# Patient Record
Sex: FEMALE | Race: WHITE | Employment: FULL TIME | URBAN - METROPOLITAN AREA
[De-identification: names, ages, dates, MRNs, and addresses within clinical notes are randomized per-mention and may not be internally consistent; named-entity substitution may affect disease eponyms.]

---

## 2018-01-13 ENCOUNTER — APPOINTMENT (OUTPATIENT)
Dept: GENERAL RADIOLOGY | Facility: CLINIC | Age: 48
End: 2018-01-13
Attending: EMERGENCY MEDICINE
Payer: COMMERCIAL

## 2018-01-13 ENCOUNTER — APPOINTMENT (OUTPATIENT)
Dept: CT IMAGING | Facility: CLINIC | Age: 48
End: 2018-01-13
Attending: EMERGENCY MEDICINE
Payer: COMMERCIAL

## 2018-01-13 ENCOUNTER — HOSPITAL ENCOUNTER (EMERGENCY)
Facility: CLINIC | Age: 48
Discharge: HOME | End: 2018-01-13
Attending: EMERGENCY MEDICINE | Admitting: EMERGENCY MEDICINE
Payer: COMMERCIAL

## 2018-01-13 VITALS
DIASTOLIC BLOOD PRESSURE: 88 MMHG | RESPIRATION RATE: 16 BRPM | TEMPERATURE: 97.9 F | WEIGHT: 158 LBS | BODY MASS INDEX: 26.29 KG/M2 | OXYGEN SATURATION: 95 % | SYSTOLIC BLOOD PRESSURE: 141 MMHG | HEART RATE: 111 BPM

## 2018-01-13 DIAGNOSIS — S10.93XA CONTUSION OF FACE, SCALP AND NECK, INITIAL ENCOUNTER: ICD-10-CM

## 2018-01-13 DIAGNOSIS — S00.03XA CONTUSION OF FACE, SCALP AND NECK, INITIAL ENCOUNTER: ICD-10-CM

## 2018-01-13 DIAGNOSIS — S00.83XA CONTUSION OF FACE, SCALP AND NECK, INITIAL ENCOUNTER: ICD-10-CM

## 2018-01-13 DIAGNOSIS — S09.90XA CLOSED HEAD INJURY, INITIAL ENCOUNTER: ICD-10-CM

## 2018-01-13 PROCEDURE — 72125 CT NECK SPINE W/O DYE: CPT

## 2018-01-13 PROCEDURE — 72072 X-RAY EXAM THORAC SPINE 3VWS: CPT

## 2018-01-13 PROCEDURE — 25000132 ZZH RX MED GY IP 250 OP 250 PS 637: Performed by: EMERGENCY MEDICINE

## 2018-01-13 PROCEDURE — 25000128 H RX IP 250 OP 636: Performed by: EMERGENCY MEDICINE

## 2018-01-13 PROCEDURE — 96374 THER/PROPH/DIAG INJ IV PUSH: CPT

## 2018-01-13 PROCEDURE — 70450 CT HEAD/BRAIN W/O DYE: CPT

## 2018-01-13 PROCEDURE — 99285 EMERGENCY DEPT VISIT HI MDM: CPT | Mod: 25

## 2018-01-13 PROCEDURE — 70480 CT ORBIT/EAR/FOSSA W/O DYE: CPT

## 2018-01-13 PROCEDURE — 73110 X-RAY EXAM OF WRIST: CPT | Mod: LT

## 2018-01-13 RX ORDER — HYDROMORPHONE HYDROCHLORIDE 1 MG/ML
0.5 INJECTION, SOLUTION INTRAMUSCULAR; INTRAVENOUS; SUBCUTANEOUS
Status: DISCONTINUED | OUTPATIENT
Start: 2018-01-13 | End: 2018-01-13 | Stop reason: HOSPADM

## 2018-01-13 RX ORDER — LIDOCAINE HYDROCHLORIDE AND EPINEPHRINE 10; 10 MG/ML; UG/ML
INJECTION, SOLUTION INFILTRATION; PERINEURAL
Status: DISCONTINUED
Start: 2018-01-13 | End: 2018-01-13 | Stop reason: HOSPADM

## 2018-01-13 RX ORDER — OXYCODONE AND ACETAMINOPHEN 5; 325 MG/1; MG/1
TABLET ORAL EVERY 4 HOURS PRN
COMMUNITY
End: 2018-01-13

## 2018-01-13 RX ORDER — OXYCODONE HYDROCHLORIDE 5 MG/1
5 TABLET ORAL ONCE
Status: COMPLETED | OUTPATIENT
Start: 2018-01-13 | End: 2018-01-13

## 2018-01-13 RX ORDER — OXYCODONE AND ACETAMINOPHEN 5; 325 MG/1; MG/1
1-2 TABLET ORAL EVERY 6 HOURS PRN
Qty: 10 TABLET | Refills: 0 | Status: SHIPPED | OUTPATIENT
Start: 2018-01-13

## 2018-01-13 RX ORDER — OXYCODONE HYDROCHLORIDE 5 MG/1
5 TABLET ORAL ONCE
Status: DISCONTINUED | OUTPATIENT
Start: 2018-01-13 | End: 2018-01-13

## 2018-01-13 RX ADMIN — OXYCODONE HYDROCHLORIDE 5 MG: 5 TABLET ORAL at 12:53

## 2018-01-13 RX ADMIN — Medication 0.5 MG: at 14:06

## 2018-01-13 ASSESSMENT — ENCOUNTER SYMPTOMS
FACIAL SWELLING: 1
NECK PAIN: 0
ABDOMINAL PAIN: 0
ABDOMINAL DISTENTION: 1
ARTHRALGIAS: 1
BACK PAIN: 1
HEADACHES: 1

## 2018-01-13 NOTE — ED AVS SNAPSHOT
[facility] Emergency Department    [address] [place] [address]    Phone:  [phone]    Fax:  [phone]                                       Ella Malave   MRN: [id number]    Department:  [facility] Emergency Department   Date of Visit:  1/13/2018           Patient Information     Date Of Birth          1970        Your diagnoses for this visit were:     Closed head injury, initial encounter     Contusion of face, scalp and neck, initial encounter        You were seen by Radha Minaya MD.      Follow-up Information     Follow up with Clinic, [facility] In 2 days.    Contact information:    [address] [facility] [address] [phone]          Discharge Instructions       Discharge Instructions  Trauma    You were seen today for an injury due to some kind of trauma (crash, fall, etc.).  Some injuries may not show up until after you leave the Emergency Department.  It is important that you pay attention to these instructions and follow-up with your regular doctor as instructed.    Return to the Emergency Department right away if:    You have abdominal pain or bruises, chest pain, pain in a new area, or pain that is getting worse.    You get short of breath.    You develop a fever over 101 degrees.    You have weakness in your arms or legs.    You faint or you are very lightheaded.    You have any new symptoms, you are feeling weak or unusually ill, or something worries you.     Injuries to the brain are possible with any accident.  Return right away if you have confusion, vomiting more than once, difficulty walking or a headache that is getting worse. Bring a child or a person who can t talk back if they seem to be behaving in an abnormal way.      MORE INFORMATION:    General Injuries:    Aches and pains are usually worse the day after your accident, but should not be severe, and should start getting better after  that. Aches and pains are common in the neck and back.    Injuries from your accident may prevent you from working.  Follow-up with your regular doctor to get a work note and to find out how long you will not be able to work.    Pain medications or your injuries may make it unsafe for you to drive or operate machinery.    Use ice to injured areas for the first one or two days. Apply a bag of ice wrapped in a cloth for about 15 minutes at a time. You can do this as often as once an hour. Do not sleep with an ice pack, since it can burn you.     You can use non-prescription pain medicine, like Tylenol  (acetaminophen), Advil  (ibuprofen), Motrin  (ibuprofen), Nuprin  (ibuprofen) if your emergency doctor or your own doctor told you this is okay. Tylenol  (acetaminophen) is in many prescription medicines and non-prescription medicines--check all of your medicines to be sure you aren t taking more than 3000 mg per day.    Limit your activity for at least one or two days. Avoid doing things that hurt.    You need to see your doctor if any injured area is not back to normal in 1 week.    Car Accident:    If you have been on a backboard or had a neck collar on, this may make you stiff and sore.  This should get better in 1-2 days.  Return to the Emergency Department if the pain or discomfort is severe or gets worse.    Be careful of shards of glass on your body or in your belongings.    Fractures, Sprains, and Strains:    Return to the Emergency Department right away if your injured area gets more painful, if the splint or dressing seems to be too tight, if it gets numb or tingly past the injury, or if the area past the injury gets pale, blue, or cold.    Use your crutches if you were given them today. Don t put weight on the injured area until the pain is gone.    Keep the injured area above the level of your heart while laying or sitting down.  This well help lessen the swelling (puffiness) and the pain.    You may use an  elastic bandage (Ace  Wrap) if it makes you more comfortable. Wrap it just tight enough to provide mild compression, and loosen it if you get swelling past the bandage.    Note about X-rays: If you had x-rays done today, they were read by your emergency physician. They will also be read later by a radiologist. We will contact you if the radiologist thinks they show something different than the emergency physician did. Remember that there are some fractures (breaks in the bone) that can t be seen right away. Even if your x-rays today were normal, you must see your doctor in clinic to re-check.     Splints:    A splint put on in the Emergency Department is temporary. Your regular doctor or orthopedic doctor will remove it, and replace it with a cast or boot if needed.    Keep the splint dry. Cover it with a plastic bag when you wash. Even with a plastic bag, you still can t get in water or let water get right on it. If it does get wet, you should come back or see your doctor to have it replaced.    Do not put objects inside the splint to scratch.    If there is an elastic bandage (Ace  Wrap) holding the splint on this may be loosened a little to relieve pressure or pain.  If pain continues return to the Emergency Department right away.    Return if the splint starts cutting into your skin.    Do not remove your splint by yourself unless told to by your doctor. You can t take it off and put it back on again.     Wounds:    Infections can follow many injuries. Watch for fevers, redness spreading from the wound, pus or stitches that open up. Return here or see your doctor if these happen.    There can always be glass, wood, dirt or other things in any wound.  They won t always show up even on x-rays.  If a wound doesn t heal, this may be why, and it is important to follow-up with your regular doctor. Small pieces of glass or other materials may work their way out on their own.    Cuts or scrapes may start to bleed after  leaving the Emergency Department.  If this happens, hold pressure on the bleeding area with a clean cloth or put pressure over the bandage.  If the bleeding doesn t stop after you use constant pressure for   hour, you should return to the Emergency Department for further treatment.    Any bandage or dressing put on here should be removed in 12-24 hours, or as your doctor instructs. Remove the dressing sooner if it seems too tight or painful, or if it is getting numb, tingly, or pale past the dressing.    After you take off the dressing, wash the cut or scrape with soap and water once or twice a day.    Apply ointment like Bacitracin  (polypeptide antibiotic) to scrapes or cuts, and keep them covered with a Band-Aid  or gauze if possible, until they heal up or until your stitches are taken out.    Dermabond  or Steri-Strips  should be left alone and will come off by themselves.  Dissolving stitches should go away or fall out within about a week.    Regular stitches need to be taken out by your doctor in clinic.  Call today and schedule an appointment.  Leave your stitches in for as long as you were told today.    Most injuries are preventable!  As your local emergency physicians, we encourage you to:    Wear your seat belt.    Do not talk on your cell phone while driving.    Do not read or send text messages while driving.    Wear a bike or motorcycle helmet.    Wear a helmet while skiing and snowboarding.    Wear personal flotation devices at all times while on the water.    Always have your child in a car seat.    Do not allow children less than 12 years old to ride in the front seat.    Go to the CDC website to find more information on preventing injures:  http://www.cdc.gov/injury/index.html    If you were given a prescription for medicine here today, be sure to read all of the information (including the package insert) that comes with your prescription.  This will include important information about the  medicine, its side effects, and any warnings that you need to know about.  The pharmacist who fills the prescription can provide more information and answer questions you may have about the medicine.  If you have questions or concerns that the pharmacist cannot address, please call or return to the Emergency Department.     Opioid Medication Information    Pain medications are among the most commonly prescribed medicines, so we are including this information for all our patients. If you did not receive pain medication or get a prescription for pain medicine, you can ignore it.     You may have been given a prescription for an opioid (narcotic) pain medicine and/or have received a pain medicine while here in the Emergency Department. These medicines can make you drowsy or impaired. You must not drive, operate dangerous equipment, or engage in any other dangerous activities while taking these medications. If you drive while taking these medications, you could be arrested for DUI, or driving under the influence. Do not drink any alcohol while you are taking these medications.     Opioid pain medications can cause addiction. If you have a history of chemical dependency of any type, you are at a higher risk of becoming addicted to pain medications.  Only take these prescribed medications to treat your pain when all other options have been tried. Take it for as short a time and as few doses as possible. Store your pain pills in a secure place, as they are frequently stolen and provide a dangerous opportunity for children or visitors in your house to start abusing these powerful medications. We will not replace any lost or stolen medicine.  As soon as your pain is better, you should flush all your remaining medication.     Many prescription pain medications contain Tylenol  (acetaminophen), including Vicodin , Tylenol #3 , Norco , Lortab , and Percocet .  You should not take any extra pills of Tylenol  if you are using  these prescription medications or you can get very sick.  Do not ever take more than 3000 mg of acetaminophen in any 24 hour period.    All opioids tend to cause constipation. Drink plenty of water and eat foods that have a lot of fiber, such as fruits, vegetables, prune juice, apple juice and high fiber cereal.  Take a laxative if you don t move your bowels at least every other day. Miralax , Milk of Magnesia, Colace , or Senna  can be used to keep you regular.      Remember that you can always come back to the Emergency Department if you are not able to see your regular doctor in the amount of time listed above, if you get any new symptoms, or if there is anything that worries you.        24 Hour Appointment Hotline       To make an appointment at any [facility] ([facility]), call [phone] ([phone]). If you don't have a family doctor or clinic, we will help you find one. [facility] are conveniently located to serve the needs of you and your family.             Review of your medicines      CONTINUE these medicines which may have CHANGED, or have new prescriptions. If we are uncertain of the size of tablets/capsules you have at home, strength may be listed as something that might have changed.        Dose / Directions Last dose taken    oxyCODONE-acetaminophen 5-325 MG per tablet   Commonly known as:  PERCOCET   Dose:  1-2 tablet   What changed:    - how much to take  - when to take this   Quantity:  10 tablet        Take 1-2 tablets by mouth every 6 hours as needed for moderate to severe pain   Refills:  0          Our records show that you are taking the medicines listed below. If these are incorrect, please call your family doctor or clinic.        Dose / Directions Last dose taken    CITALOPRAM HYDROBROMIDE PO   Dose:  20 mg        Take 20 mg by mouth. She takes 1/2 of a 40 mg tablet   Refills:  0        GABAPENTIN PO   Dose:  900 mg        Take 900 mg by mouth 3 times daily.   Refills:  0         ibuprofen 600 MG tablet   Commonly known as:  ADVIL/MOTRIN   Dose:  600 mg        Take 1 tablet by mouth every 6 hours as needed for other (mild pain).   Refills:  0        LEVOTHROID PO        Refills:  0        multivitamin, therapeutic with minerals Tabs tablet   Dose:  1 tablet        Take 1 tablet by mouth daily.   Refills:  0        olopatadine 0.1 % ophthalmic solution   Commonly known as:  PATANOL   Dose:  1 drop        Place 1 drop into both eyes 2 times daily.   Refills:  0                Prescriptions were sent or printed at these locations (1 Prescription)                   Other Prescriptions                Printed at Department/Unit printer (1 of 1)         oxyCODONE-acetaminophen (PERCOCET) 5-325 MG per tablet                Procedures and tests performed during your visit     CT Cervical Spine w/o Contrast    CT Head w/o Contrast    CT Temporal Orbital Sella w/o Contrast    XR Thoracic Spine 3 Views    XR Wrist Left G/E 3 Views      Orders Needing Specimen Collection     None      Pending Results     No orders found from 1/11/2018 to 1/14/2018.            Pending Culture Results     No orders found from 1/11/2018 to 1/14/2018.            Pending Results Instructions     If you had any lab results that were not finalized at the time of your Discharge, you can call the ED Lab Result RN at [phone]. You will be contacted by this team for any positive Lab results or changes in treatment. The nurses are available 7 days a week from 10A to 6:30P.  You can leave a message 24 hours per day and they will return your call.        Test Results From Your Hospital Stay        1/13/2018  1:52 PM      Narrative     CT SCAN OF THE HEAD WITHOUT CONTRAST January 13, 2018 1:20 PM     HISTORY: Fall, concern for skull fracture.    TECHNIQUE: Axial images of the head and coronal reformations without  IV contrast material. Radiation dose for this scan was reduced using  automated exposure control, adjustment of the mA  and/or kV according  to patient size, or iterative reconstruction technique.    COMPARISON: None.    FINDINGS: Large frontal scalp hematoma with soft tissue swelling. No  underlying calvarial fracture is appreciated. Please see following  facial bone CT for details of the facial bones. There is a cluster of  2-3 mm metallic densities projecting over the left frontal scalp  (axial image 10) concerning for a foreign body.    No evidence of acute intracranial hemorrhage. No mass effect or  midline shift. Ventricular size within normal limits without  hydrocephalus. No abnormal extra-axial fluid collection.    The visualized portions of the sinuses and mastoids appear normal. The  bony calvarium and bones of the skull base appear intact.         Impression     IMPRESSION:      1. Large frontal scalp hematoma without underlying calvarial fracture.  Clustered 2-3 mm metallic densities within the left frontal scalp  concerning for foreign bodies.  2. No evidence of acute intracranial hemorrhage, mass, or herniation.    ABDIEL SOSA MD         1/13/2018  1:52 PM      Narrative     CT TEMPORAL ORBITAL SELLA WITHOUT CONTRAST January 13, 2018 1:29 PM    HISTORY: Evaluate for temporal bone fracture.     TECHNIQUE: Using thin collimation multidetector helical acquisition  technique, axial and coronal thin section CT images were reconstructed  through the facial bones. Images were reviewed in bone and soft tissue  windows.    FINDINGS: Large hematoma in the frontal scalp right greater than left  which also extends into the right greater than left preorbital soft  tissues. No post septal inflammation or swelling is appreciated. There  is no evident fracture of the facial bones. There is no temporal bone  fracture. The cribriform plate appears intact. Alignment of the facial  bones appears normal.     There is no hematoma, soft tissue mass or gas visualized within the  orbits. Minimal mucosal thickening along the inferior frontal  sinuses.  Remaining paranasal sinuses are clear. The mastoid air cells are  clear.         Impression     IMPRESSION: Large frontal scalp hematoma extending to the preorbital  regions right greater than left. No underlying facial bone fracture.  No fracture of the skull base or temporal bones.         ABDIEL SOSA MD         1/13/2018  1:52 PM      Narrative     CT CERVICAL SPINE WITHOUT CONTRAST January 13, 2018 1:21 PM     HISTORY: Fall.     TECHNIQUE: Axial images of the cervical spine were obtained without  intravenous contrast. Multiplanar reformations were performed.   Radiation dose for this scan was reduced using automated exposure  control, adjustment of the mA and/or kV according to patient size, or  iterative reconstruction technique.    COMPARISON: None.    FINDINGS: There are postoperative changes of anterior plate and screw  fixation from C5 to C7 as well as intervertebral disc spacers at C5-C6  and C6-C7. Surgical hardware appears intact. There is no lucency  around the screw to suggest loosening or infection.    Slight reversal of the normal cervical lordosis. Anteroposterior  alignment spine within normal limits. No lucent fracture lines  identified. No abnormal prevertebral soft tissue swelling. No  suspicious osseous lesions. No significant spinal canal or neural  foraminal narrowing.    Visualized paraspinous tissues: Unremarkable.        Impression     IMPRESSION:   1. Postoperative changes of anterior fusion from C5 to C7. Surgical  hardware appears intact.  2. No evidence of acute fracture or subluxation in the cervical spine.      ABDIEL SOSA MD         1/13/2018  2:05 PM      Narrative     WRIST LEFT THREE OR MORE VIEWS January 13, 2018 1:47 PM     HISTORY: Fall.    COMPARISON: None.        Impression     IMPRESSION: The lateral view demonstrates mild focal prominence at the  expected location of the triquetrum posteriorly. An age indeterminate  fracture to this location is a possibility.  Recommend focused physical  exam to this region. No other acute injury identified.    JAYA NOLAN MD               1/13/2018  2:05 PM      Narrative     THORACIC SPINE THREE VIEWS January 13, 2018 1:48 PM     HISTORY: Fall.    COMPARISON: None.        Impression     IMPRESSION: No acute fracture identified. Anatomic alignment. Mild  degenerative disc disease changes diffusely.    JAYA NOLAN MD                Clinical Quality Measure: Blood Pressure Screening     Your blood pressure was checked while you were in the emergency department today. The last reading we obtained was  BP: 141/88 (Simultaneous filing. User may not have seen previous data.) . Please read the guidelines below about what these numbers mean and what you should do about them.  If your systolic blood pressure (the top number) is less than 120 and your diastolic blood pressure (the bottom number) is less than 80, then your blood pressure is normal. There is nothing more that you need to do about it.  If your systolic blood pressure (the top number) is 120-139 or your diastolic blood pressure (the bottom number) is 80-89, your blood pressure may be higher than it should be. You should have your blood pressure rechecked within a year by a primary care provider.  If your systolic blood pressure (the top number) is 140 or greater or your diastolic blood pressure (the bottom number) is 90 or greater, you may have high blood pressure. High blood pressure is treatable, but if left untreated over time it can put you at risk for heart attack, stroke, or kidney failure. You should have your blood pressure rechecked by a primary care provider within the next 4 weeks.  If your provider in the emergency department today gave you specific instructions to follow-up with your doctor or provider even sooner than that, you should follow that instruction and not wait for up to 4 weeks for your follow-up visit.        Thank you for choosing [facility]       Thank you  "for choosing [facility] for your care. Our goal is always to provide you with excellent care. Hearing back from our patients is one way we can continue to improve our services. Please take a few minutes to complete the written survey that you may receive in the mail after you visit with us. Thank you!        [employer] Information     [employer] lets you send messages to your doctor, view your test results, renew your prescriptions, schedule appointments and more. To sign up, go to [email or web address] . Click on \"Log in\" on the left side of the screen, which will take you to the Welcome page. Then click on \"Sign up Now\" on the right side of the page.     You will be asked to enter the access code listed below, as well as some personal information. Please follow the directions to create your username and password.     Your access code is: [email or web address]  Expires: 2018  3:45 PM     Your access code will  in 90 days. If you need help or a new code, please call your [facility] clinic or [phone].        Care EveryWhere ID     This is your Care EveryWhere ID. This could be used by other organizations to access your [facility] medical records  [id number]        Equal Access to Services     KONRAD CABRALES : Ced Flores, harlan swain, qatri corrigan, olaf reyna. So [facility] [phone].    ATENCIÓN: Si habla español, tiene a shi disposición servicios gratuitos de asistencia lingüística. Llame al [phone].    We comply with applicable federal civil rights laws and [place] laws. We do not discriminate on the basis of race, color, national origin, age, disability, sex, sexual orientation, or gender identity.            After Visit Summary       This is your record. Keep this with you and show to your community pharmacist(s) and doctor(s) at your next visit.                  "

## 2018-01-13 NOTE — ED PROVIDER NOTES
"  History     Chief Complaint:  Fall     HPI   Ella Malave is a 47 year old female with history of back pain s/p spinal fusion who presents to the emergency department today for evaluation of a fall. The patient reports she lives in a \"tiny home\" and her stairs do not have a railing, and in the middle of the night she missed a step when getting up from the bathroom, falling down all of the steps. She estimates there are about 7 steps. From the fall the patient injured her face and right periorbital region. After the fall the patient went to sleep but then when she woke up this morning and saw her face became concerned and presented in the emergency department for evaluation. She now endorses a headache and right facial pain.The patient believes she braced her fall with her left foot and left wrist, and now endorses pain to both those areas. She states she has not been able to open her left eye completely secondary to the swelling from the fall. The patient denies any neck pain, but does endorse some mid back pain. Patient denies any abdominal pain.    Allergies:  Codeine Sulfate  Darvocet [Propoxyphene N-Apap]      Medications:    traZODone (DESYREL) 50 MG tablet  CITALOPRAM HYDROBROMIDE PO  Cyclobenzaprine HCl (FLEXERIL PO)  GABAPENTIN PO    Past Medical History:    Back pain  Sciatica pain     Past Surgical History:    GYN surgery  Orthopedic surgery   Spinal fusion    Family History:    History reviewed. No pertinent family history.      Social History:  The patient was accompanied to the ED by her daughter.  Smoking Status: Current every day smoker  Smokeless Tobacco: No   Alcohol Use: Yes    Marital Status:  Single      Review of Systems   HENT: Positive for facial swelling (right periorbital region).    Gastrointestinal: Positive for abdominal distention. Negative for abdominal pain.   Musculoskeletal: Positive for arthralgias (left elbow and left foot) and back pain (middle). Negative for neck pain. "   Neurological: Positive for headaches.   All other systems reviewed and are negative.    Physical Exam     Patient Vitals for the past 24 hrs:   BP Temp Temp src Pulse Resp SpO2 Weight   01/13/18 1430 141/88 - - - - - -   01/13/18 1415 (!) 135/99 - - - - 95 % -   01/13/18 1400 (!) 149/97 - - - - - -   01/13/18 1345 (!) 164/103 - - - - 100 % -   01/13/18 1205 136/88 97.9  F (36.6  C) Oral 111 16 99 % 71.7 kg (158 lb)      Physical Exam  General: Patient is alert and interactive when I enter the room  Head:  Significant facial trauma. Ecchymosis to nasal bridge and bilateral tarsal plate, more swelling of right eyelid, large right sided frontal hematoma with small overlying abrasion, no scalp hematoma  Eyes:  Conjunctivae are normal, PERRL, normal conjunctiva, EOMI without pain, vision intact bilaterally, no proptosis   ENT:    The nose is normal    Pinnae are normal    External acoustic canals are normal  Neck:  Trachea midline, no midline cervical tenderness  CV:  Pulses are normal, RRR  Resp:  No respiratory distress, CTAB   Abdomen:      Soft, non-tender, non-distended  Musc:  Normal muscular tone, no midline spinal tenderness, left lower thoracic paraspinal tenderness with mild overlying ecchymosis     No major joint effusions    No asymmetric leg swelling    Good capillary refill noted  Skin:  No rash or lesions noted  Neuro:  Speech is normal and fluent. Face is symmetric.     Moving all extremities well.   Psych: Awake. Alert.  Normal affect.  Appropriate interactions.      Emergency Department Course     Imaging:  Radiology findings were communicated with the patient and family who voiced understanding of the findings.    CT Head w/o contrast:  IMPRESSION:      1. Large frontal scalp hematoma without underlying calvarial fracture.  Clustered 2-3 mm metallic densities within the left frontal scalp  concerning for foreign bodies.  2. No evidence of acute intracranial hemorrhage, mass, or herniation.  Report  per radiology       CT Cervical Spine w/o contrast:  IMPRESSION:   1. Postoperative changes of anterior fusion from C5 to C7. Surgical  hardware appears intact.  2. No evidence of acute fracture or subluxation in the cervical spine.  Report per radiology      CT Temporal Orbital Sella w/o contrast:  IMPRESSION: Large frontal scalp hematoma extending to the preorbital  regions right greater than left. No underlying facial bone fracture.  No fracture of the skull base or temporal bones.  Report per radiology      XR Wrist Left G/E 3 Views:  IMPRESSION: The lateral view demonstrates mild focal prominence at the  expected location of the triquetrum posteriorly. An age indeterminate  fracture to this location is a possibility. Recommend focused physical  exam to this region. No other acute injury identified.  Reading per radiology.       XR Thoracic Spine 3 Views:   IMPRESSION: No acute fracture identified. Anatomic alignment. Mild  degenerative disc disease changes diffusely.  Reading per radiology.      Interventions:  1253 Oxycodone 5mg PO  1406 Dilaudid 0.5mg IV       Emergency Department Course:  Nursing notes and vitals reviewed.  1233 I performed an exam of the patient as documented above.   The patient was sent for a cervical spine CT, head CT, and temporal orbital CT and wrist and thoracic spine x-ray while in the emergency department, results above.    1336 I spoke with Dr. Dias of the Radiology service regarding patient's presentation, findings, and plan of care.   1348 I rechecked the patient and updated her on her CT results.   1435 I rechecked and updated the patient.   Findings and plan explained to the Patient. Patient discharged home with instructions regarding supportive care, medications, and reasons to return. The importance of close follow-up was reviewed. The patient was prescribed Norco and Zofran. I personally reviewed the imaging results with the Patient and daughter and answered all related  questions prior to discharge.   Impression & Plan      Medical Decision Making:  Ella Malave is a 47 year old female with history of chronic back pain who presents after a fall. The patient sustained significant facial trauma with swelling to her face and eyes. There was concern for raccoon's eyes which could be sign of basilar skull fracture so CT of the head and temporal bones was ordered and a CT cervical spine as she is status post spinal fusion. CT head showed no signs of intracranial bleeding however she has a large frontal hematoma and interestingly she has a foreign body on he left side of her forehead. This is not where she had the fall and then the patient was able to state that she fell many years ago and had a cut on that side so that is likely rocks from there. There are no signs of infection so I do not think there is any indication for foreign body removal however she now knows about it. There are no lacerations to be repaired. I was able to lift her eyelid up and she has good vision of her right eye so no signs of occular involvement. She has good ocular movements. It is possible she has a nasal bone fracture although no malalignment The patient was given dilaudid for pain control as she was quite uncomfortable. She did have some wrist and ankle pain and back pain so I did obtain x-rays. The left wrist x-ray showed a possible triquetrium fracture however she has no tenderness on this side so I think this is old; she's had multiple falls in the past. She has no scaphoid tenderness on the right. She did have some mild paraspinal tenderness so a thoracic x-ray was obtained. X-ray was normal. The patient was able to ambulate in the emergency department. 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 I told her I would give her a short course for this weekend but I gave her  careful return precautions in the setting of significant head trauma however she is many hours out so with a negative CT I am confident she will be ok. The patient was discharged with close follow up.     Diagnosis:    ICD-10-CM    1. Closed head injury, initial encounter S09.90XA    2. Contusion of face, scalp and neck, initial encounter S00.83XA     S00.03XA     S10.93XA        Disposition:  Discharged to home with the below prescription.     Discharge Medications:  New Prescriptions    No medications on file       Scribe Disclosure:  I, Jose Ramon Stoddard, am serving as a scribe at 12:03 PM on 1/13/2018 to document services personally performed by Radha Minaya MD based on my observations and the provider's statements to me.    1/13/2018   [facility] EMERGENCY DEPARTMENT       Radha Minaya MD  01/14/18 0752

## 2018-01-13 NOTE — LETTER
January 13, 2018        To Whom It May Concern:        Ella Malave was seen in our Emergency Department today, 01/13/18.  We expect her condition to improve over the next 3 days.  She may return to work when improved.      Sincerely,        Tasha Beatty RN

## 2018-01-13 NOTE — ED AVS SNAPSHOT
[facility] Emergency Department    [address]    [facility] [address]    Phone:  [phone]    Fax:  [phone]                                       Ella Malave   MRN: [id number]    Department:  [facility] Emergency Department   Date of Visit:  1/13/2018           After Visit Summary Signature Page     I have received my discharge instructions, and my questions have been answered. I have discussed any challenges I see with this plan with the nurse or doctor.    ..........................................................................................................................................  Patient/Patient Representative Signature      ..........................................................................................................................................  Patient Representative Print Name and Relationship to Patient    ..................................................               ................................................  Date                                            Time    ..........................................................................................................................................  Reviewed by Signature/Title    ...................................................              ..............................................  Date                                                            Time

## 2018-01-17 ENCOUNTER — APPOINTMENT (OUTPATIENT)
Dept: CT IMAGING | Facility: CLINIC | Age: 48
End: 2018-01-17
Attending: EMERGENCY MEDICINE
Payer: COMMERCIAL

## 2018-01-17 ENCOUNTER — HOSPITAL ENCOUNTER (EMERGENCY)
Facility: CLINIC | Age: 48
Discharge: HOME | End: 2018-01-17
Attending: EMERGENCY MEDICINE | Admitting: EMERGENCY MEDICINE
Payer: COMMERCIAL

## 2018-01-17 VITALS
WEIGHT: 158 LBS | OXYGEN SATURATION: 97 % | BODY MASS INDEX: 26.29 KG/M2 | DIASTOLIC BLOOD PRESSURE: 71 MMHG | RESPIRATION RATE: 18 BRPM | HEART RATE: 79 BPM | SYSTOLIC BLOOD PRESSURE: 108 MMHG | TEMPERATURE: 98.6 F

## 2018-01-17 DIAGNOSIS — F07.81 POSTCONCUSSION SYNDROME: ICD-10-CM

## 2018-01-17 LAB
ANION GAP SERPL CALCULATED.3IONS-SCNC: 6 MMOL/L (ref 3–14)
BASOPHILS # BLD AUTO: 0 10E9/L (ref 0–0.2)
BASOPHILS NFR BLD AUTO: 0.8 %
BUN SERPL-MCNC: 11 MG/DL (ref 7–30)
CALCIUM SERPL-MCNC: 8.4 MG/DL (ref 8.5–10.1)
CHLORIDE SERPL-SCNC: 106 MMOL/L (ref 94–109)
CO2 SERPL-SCNC: 27 MMOL/L (ref 20–32)
CREAT SERPL-MCNC: 0.75 MG/DL (ref 0.52–1.04)
DIFFERENTIAL METHOD BLD: ABNORMAL
EOSINOPHIL # BLD AUTO: 0.3 10E9/L (ref 0–0.7)
EOSINOPHIL NFR BLD AUTO: 5 %
ERYTHROCYTE [DISTWIDTH] IN BLOOD BY AUTOMATED COUNT: 12.8 % (ref 10–15)
GFR SERPL CREATININE-BSD FRML MDRD: 82 ML/MIN/1.7M2
GLUCOSE SERPL-MCNC: 93 MG/DL (ref 70–99)
HCT VFR BLD AUTO: 35.7 % (ref 35–47)
HGB BLD-MCNC: 11.7 G/DL (ref 11.7–15.7)
IMM GRANULOCYTES # BLD: 0 10E9/L (ref 0–0.4)
IMM GRANULOCYTES NFR BLD: 0.2 %
LYMPHOCYTES # BLD AUTO: 2.4 10E9/L (ref 0.8–5.3)
LYMPHOCYTES NFR BLD AUTO: 45.8 %
MCH RBC QN AUTO: 30.9 PG (ref 26.5–33)
MCHC RBC AUTO-ENTMCNC: 32.8 G/DL (ref 31.5–36.5)
MCV RBC AUTO: 94 FL (ref 78–100)
MONOCYTES # BLD AUTO: 0.4 10E9/L (ref 0–1.3)
MONOCYTES NFR BLD AUTO: 7.7 %
NEUTROPHILS # BLD AUTO: 2.1 10E9/L (ref 1.6–8.3)
NEUTROPHILS NFR BLD AUTO: 40.5 %
NRBC # BLD AUTO: 0 10*3/UL
NRBC BLD AUTO-RTO: 0 /100
PLATELET # BLD AUTO: 272 10E9/L (ref 150–450)
POTASSIUM SERPL-SCNC: 3.8 MMOL/L (ref 3.4–5.3)
RBC # BLD AUTO: 3.79 10E12/L (ref 3.8–5.2)
SODIUM SERPL-SCNC: 139 MMOL/L (ref 133–144)
WBC # BLD AUTO: 5.2 10E9/L (ref 4–11)

## 2018-01-17 PROCEDURE — 70450 CT HEAD/BRAIN W/O DYE: CPT

## 2018-01-17 PROCEDURE — 96361 HYDRATE IV INFUSION ADD-ON: CPT

## 2018-01-17 PROCEDURE — 99284 EMERGENCY DEPT VISIT MOD MDM: CPT | Mod: 25

## 2018-01-17 PROCEDURE — 80048 BASIC METABOLIC PNL TOTAL CA: CPT | Performed by: EMERGENCY MEDICINE

## 2018-01-17 PROCEDURE — 96374 THER/PROPH/DIAG INJ IV PUSH: CPT

## 2018-01-17 PROCEDURE — 85025 COMPLETE CBC W/AUTO DIFF WBC: CPT | Performed by: EMERGENCY MEDICINE

## 2018-01-17 PROCEDURE — 25000128 H RX IP 250 OP 636: Performed by: EMERGENCY MEDICINE

## 2018-01-17 RX ORDER — ACETAMINOPHEN 325 MG/1
975 TABLET ORAL ONCE
Status: DISCONTINUED | OUTPATIENT
Start: 2018-01-17 | End: 2018-01-17 | Stop reason: HOSPADM

## 2018-01-17 RX ORDER — SODIUM CHLORIDE 9 MG/ML
1000 INJECTION, SOLUTION INTRAVENOUS CONTINUOUS
Status: DISCONTINUED | OUTPATIENT
Start: 2018-01-17 | End: 2018-01-17 | Stop reason: HOSPADM

## 2018-01-17 RX ORDER — DIPHENHYDRAMINE HCL 25 MG
25 CAPSULE ORAL ONCE
Status: DISCONTINUED | OUTPATIENT
Start: 2018-01-17 | End: 2018-01-17 | Stop reason: HOSPADM

## 2018-01-17 RX ADMIN — SODIUM CHLORIDE 1000 ML: 9 INJECTION, SOLUTION INTRAVENOUS at 15:20

## 2018-01-17 ASSESSMENT — ENCOUNTER SYMPTOMS
FREQUENCY: 0
DYSURIA: 0
HEADACHES: 1
HEMATURIA: 0
DIZZINESS: 1

## 2018-01-17 NOTE — LETTER
[facility] EMERGENCY DEPARTMENT  [address] [place] [address]  Phone: [phone]  Fax: [phone]    January 17, 2018        [place]lla Malave  [address] [place] [address]          To whom it may concern:    RE: Ella Malave    Patient was seen and treated today at our emergency room. Please excuse her from work tomorrow for medical reasons.    Please contact me for questions or concerns.      Sincerely,        Mony Hall MD

## 2018-01-17 NOTE — ED AVS SNAPSHOT
[facility] Emergency Department    [address]    [facility] [address]    Phone:  [phone]    Fax:  [phone]                                       Ella Malave   MRN: [id number]    Department:  [facility] Emergency Department   Date of Visit:  1/17/2018           After Visit Summary Signature Page     I have received my discharge instructions, and my questions have been answered. I have discussed any challenges I see with this plan with the nurse or doctor.    ..........................................................................................................................................  Patient/Patient Representative Signature      ..........................................................................................................................................  Patient Representative Print Name and Relationship to Patient    ..................................................               ................................................  Date                                            Time    ..........................................................................................................................................  Reviewed by Signature/Title    ...................................................              ..............................................  Date                                                            Time

## 2018-01-17 NOTE — ED NOTES
Patient states she fell down stairs on Saturday- large hematoma on head on bruising on face. Patient states she went to ED on SAturday and had a head CT that was normal. Patient states she now feels dizziness and headache. Last took percocet at 11:30 did not help pain. ABC intact alert and no distress.

## 2018-01-17 NOTE — ED AVS SNAPSHOT
[facility] Emergency Department    [address]    [facility] [place] [address]    Phone:  [phone]    Fax:  [phone]                                       Ella Malave   MRN: [id number]    Department:  [facility] Emergency Department   Date of Visit:  1/17/2018           Patient Information     Date Of Birth          1970        Your diagnoses for this visit were:     Postconcussion syndrome        You were seen by Mony Hall MD.      Follow-up Information     Follow up with Primary clinic and/or concussion clinic.    Why:  2-3 days for reassessment        Follow up with [facility] Emergency Department.    Specialty:  EMERGENCY MEDICINE    Why:  As needed, If symptoms worsen    Contact information:    [address]  [facility] [address] [phone]      Discharge References/Attachments     (S) HEALING AFTER A CONCUSSION (ENGLISH)      24 Hour Appointment Hotline       To make an appointment at any [facility] clinic, call [phone]). If you don't have a family doctor or clinic, we will help you find one. [facility] clinics are conveniently located to serve the needs of you and your family.             Review of your medicines      Our records show that you are taking the medicines listed below. If these are incorrect, please call your family doctor or clinic.        Dose / Directions Last dose taken    CITALOPRAM HYDROBROMIDE PO   Dose:  20 mg        Take 20 mg by mouth. She takes 1/2 of a 40 mg tablet   Refills:  0        GABAPENTIN PO   Dose:  900 mg        Take 900 mg by mouth 3 times daily.   Refills:  0        ibuprofen 600 MG tablet   Commonly known as:  ADVIL/MOTRIN   Dose:  600 mg        Take 1 tablet by mouth every 6 hours as needed for other (mild pain).   Refills:  0        LEVOTHROID PO        Refills:  0        multivitamin, therapeutic with minerals Tabs tablet   Dose:  1 tablet         Take 1 tablet by mouth daily.   Refills:  0        olopatadine 0.1 % ophthalmic solution   Commonly known as:  PATANOL   Dose:  1 drop        Place 1 drop into both eyes 2 times daily.   Refills:  0        oxyCODONE-acetaminophen 5-325 MG per tablet   Commonly known as:  PERCOCET   Dose:  1-2 tablet   Quantity:  10 tablet        Take 1-2 tablets by mouth every 6 hours as needed for moderate to severe pain   Refills:  0                Procedures and tests performed during your visit     Basic metabolic panel    CBC with platelets differential    CT Head w/o Contrast    Cardiac Continuous Monitoring    Peripheral IV: Standard    Pulse oximetry nursing      Orders Needing Specimen Collection     None      Pending Results     No orders found from 1/15/2018 to 1/18/2018.            Pending Culture Results     No orders found from 1/15/2018 to 1/18/2018.            Pending Results Instructions     If you had any lab results that were not finalized at the time of your Discharge, you can call the ED Lab Result RN at [phone]. You will be contacted by this team for any positive Lab results or changes in treatment. The nurses are available 7 days a week from 10A to 6:30P.  You can leave a message 24 hours per day and they will return your call.        Test Results From Your Hospital Stay        1/17/2018  3:37 PM      Component Results     Component Value Ref Range & Units Status    Sodium 139 133 - 144 mmol/L Final    Potassium 3.8 3.4 - 5.3 mmol/L Final    Chloride 106 94 - 109 mmol/L Final    Carbon Dioxide 27 20 - 32 mmol/L Final    Anion Gap 6 3 - 14 mmol/L Final    Glucose 93 70 - 99 mg/dL Final    Urea Nitrogen 11 7 - 30 mg/dL Final    Creatinine 0.75 0.52 - 1.04 mg/dL Final    GFR Estimate 82 >60 mL/min/1.7m2 Final    Non  GFR Calc    GFR Estimate If Black >90 >60 mL/min/1.7m2 Final    African American GFR Calc    Calcium 8.4 (L) 8.5 - 10.1 mg/dL Final         1/17/2018  3:22 PM      Component  Results     Component Value Ref Range & Units Status    WBC 5.2 4.0 - 11.0 10e9/L Final    RBC Count 3.79 (L) 3.8 - 5.2 10e12/L Final    Hemoglobin 11.7 11.7 - 15.7 g/dL Final    Hematocrit 35.7 35.0 - 47.0 % Final    MCV 94 78 - 100 fl Final    MCH 30.9 26.5 - 33.0 pg Final    MCHC 32.8 31.5 - 36.5 g/dL Final    RDW 12.8 10.0 - 15.0 % Final    Platelet Count 272 150 - 450 10e9/L Final    Diff Method Automated Method  Final    % Neutrophils 40.5 % Final    % Lymphocytes 45.8 % Final    % Monocytes 7.7 % Final    % Eosinophils 5.0 % Final    % Basophils 0.8 % Final    % Immature Granulocytes 0.2 % Final    Nucleated RBCs 0 0 /100 Final    Absolute Neutrophil 2.1 1.6 - 8.3 10e9/L Final    Absolute Lymphocytes 2.4 0.8 - 5.3 10e9/L Final    Absolute Monocytes 0.4 0.0 - 1.3 10e9/L Final    Absolute Eosinophils 0.3 0.0 - 0.7 10e9/L Final    Absolute Basophils 0.0 0.0 - 0.2 10e9/L Final    Abs Immature Granulocytes 0.0 0 - 0.4 10e9/L Final    Absolute Nucleated RBC 0.0  Final         1/17/2018  4:08 PM      Narrative     CT SCAN OF THE HEAD WITHOUT CONTRAST   1/17/2018 3:50 PM     HISTORY: Headache;     TECHNIQUE:  Axial images of the head and coronal reformations without  IV contrast material. Radiation dose for this scan was reduced using  automated exposure control, adjustment of the mA and/or kV according  to patient size, or iterative reconstruction technique.    COMPARISON: CT dated 1/13/2018.    FINDINGS:  The ventricles are normal in size, shape and configuration.   The brain parenchyma and subarachnoid spaces are normal. There is no  evidence of intracranial hemorrhage, mass, acute infarct or anomaly.  The visualized portions of the sinuses and mastoids appear normal.  There is significant soft tissue swelling over the right frontal  region. This is consistent with a hematoma. No intracranial bleed or  skull fractures. There is a small amount of gas in the region of the  cavernous sinus. This is probably  iatrogenic secondary to intravenous  access. Again noted is a small calcification in the soft tissues of  the left frontal region. This was present on the previous scan.        Impression     IMPRESSION:   1. No bleed or fracture. No brain contusions are identified.  2. Significant soft tissue swelling of the right frontal region  consistent with a hematoma.   This is slightly smaller than on  1/13/2018.   3. There are a few bubbles of gas in the cavernous sinuses. This is  probably iatrogenic due to vascular access. This is a new finding when  compared to 1/13/2018.      NADINE BETH MD                Clinical Quality Measure: Blood Pressure Screening     Your blood pressure was checked while you were in the emergency department today. The last reading we obtained was  BP: 109/73 . Please read the guidelines below about what these numbers mean and what you should do about them.  If your systolic blood pressure (the top number) is less than 120 and your diastolic blood pressure (the bottom number) is less than 80, then your blood pressure is normal. There is nothing more that you need to do about it.  If your systolic blood pressure (the top number) is 120-139 or your diastolic blood pressure (the bottom number) is 80-89, your blood pressure may be higher than it should be. You should have your blood pressure rechecked within a year by a primary care provider.  If your systolic blood pressure (the top number) is 140 or greater or your diastolic blood pressure (the bottom number) is 90 or greater, you may have high blood pressure. High blood pressure is treatable, but if left untreated over time it can put you at risk for heart attack, stroke, or kidney failure. You should have your blood pressure rechecked by a primary care provider within the next 4 weeks.  If your provider in the emergency department today gave you specific instructions to follow-up with your doctor or provider even sooner than that, you should  "follow that instruction and not wait for up to 4 weeks for your follow-up visit.        Thank you for choosing [facility]       Thank you for choosing [facility] for your care. Our goal is always to provide you with excellent care. Hearing back from our patients is one way we can continue to improve our services. Please take a few minutes to complete the written survey that you may receive in the mail after you visit with us. Thank you!        [facility] Information     [facility] lets you send messages to your doctor, view your test results, renew your prescriptions, schedule appointments and more. To sign up, go to [email or web address] . Click on \"Log in\" on the left side of the screen, which will take you to the Welcome page. Then click on \"Sign up Now\" on the right side of the page.     You will be asked to enter the access code listed below, as well as some personal information. Please follow the directions to create your username and password.     Your access code is: [email or web address]  Expires: 2018  3:45 PM     Your access code will  in 90 days. If you need help or a new code, please call your [facility] clinic or [phone].        Care EveryWhere ID     This is your Care EveryWhere ID. This could be used by other organizations to access your [facility] medical records  [id number]        Equal Access to Services     KONRAD CABRALES : Hadii cresencio Flores, waaxda luqadaha, qaybta kaalmada shekhar, olaf cheung . So [facility] [phone].    ATENCIÓN: Si habla español, tiene a shi disposición servicios gratuitos de asistencia lingüística. Llame al [phone].    We comply with applicable federal civil rights laws and [place] laws. We do not discriminate on the basis of race, color, national origin, age, disability, sex, sexual orientation, or gender identity.            After Visit Summary       This is your record. Keep this with you and show to your community pharmacist(s) " and doctor(s) at your next visit.

## 2018-01-17 NOTE — ED PROVIDER NOTES
History     Chief Complaint:  Head Injury and Dizziness      HPI   Ella Malave is a 47 year old female who presents for her second visit after a head injury and development of dizziness. The original event occurred 4 days days ago when she fell down the stairs. She underwent an emergent head CT at that time which was negative. She saw her PCP yesterday and notes her symptoms were improving, including her headache that was under control with Percocet. She notes she has been eating and drinking sufficiently. Patient notes she has has intermittent nausea. Today, she notes an onset of dizziness in association with a blurry vision, slower thinking and gradual increase of her headache. Patient describes a sensation of heaviness, and being pulled from one side to the other. She states she has not had side effects from Percocet before. She had no new injury or stressors today, however she went back to work where she uses a computer most of the day. She denies any other concerns on today's visit.    Allergies:  Codeine sulfate  Darvocet     Medications:    Levothyroxine  Percocet  Citalopram  Gabapentin  Multivitamin  Advil  Patanol     Past Medical History:    Back pain    Past Surgical History:    Gyn surgery  Orthopedic surgery    Family History:    History reviewed. No pertinent family history.    Social History:  Marital Status: Single  Presents to the ED alone.   Tobacco Use: Current every day smoker (0.75 ppd)  Alcohol Use: Yes  PCP: [facility]     Review of Systems   Eyes: Positive for visual disturbance.   Genitourinary: Negative for dysuria, frequency and hematuria.   Neurological: Positive for dizziness and headaches.   All other systems reviewed and are negative.      Physical Exam   First Vitals:  BP: 109/73  Pulse: 79  Temp: 98.6  F (37  C)  Resp: 20  Weight: 71.7 kg (158 lb)  SpO2: 97 %      Physical Exam  General: Adult female sitting upright.   Eyes: PERRL, Conjunctive within  normal limits. Bilateral periorbital ecchymosis. No hyphema. EOMI.   HENT: Moist mucous membranes, oropharynx clear. No hemotympanum . Bilateral diffuse facial ecchymoses.   Neck: normal spontaneous range of motion.  CV: Normal S1S2, no murmur, rub or gallop. Regular rate and rhythm  Resp: Clear to auscultation bilaterally, no wheezes, rales or rhonchi. Normal respiratory effort.  GI: Abdomen is soft, nontender and nondistended. No palpable masses. No rebound or guarding.  MSK: No edema. Nontender. Normal active range of motion.  Skin: Warm and dry. No rashes or lesions or ecchymoses on visible skin.  Neuro: Alert and oriented. Responds appropriately to all questions and commands. No focal findings appreciated. Normal muscle tone. GCS 15.   Psych: Normal mood and affect. Pleasant.    Emergency Department Course     Imaging:  Head CT, without contrast, per radiology:   IMPRESSION:   1. No bleed or fracture. No brain contusions are identified.  2. Significant soft tissue swelling of the right frontal region  consistent with a hematoma.   This is slightly smaller than on  1/13/2018.   3. There are a few bubbles of gas in the cavernous sinuses. This is  probably iatrogenic due to vascular access. This is a new finding when  compared to 1/13/2018.    Radiographic findings were communicated with the patient who voiced understanding of the findings.    Laboratory:  CBC:  WBC 5.2, HGB 11.7, , otherwise WNL  BMP: Calcium 8.4 (L), otherwise WNL (Creatinine 0.75)    Interventions:  1520: Normal Saline, 1 liter, IV bolus     Emergency Department Course:  Nursing notes and vitals reviewed.  1437: I performed an exam of the patient as documented above.  The above workup was undertaken.  1604: I consulted with Dr. Olmstead of radiology regarding patient's CT head.   1611: I rechecked the patient and discussed results. She denies any new concerns. Feels comfortable with going home (declined pain medication here as she would  prefer to drive herself).  Findings and plan explained to the Patient. Patient discharged home, status improved, with instructions regarding supportive care, medications, and reasons to return as well as the importance of close follow-up was reviewed.    Impression & Plan      Medical Decision Making:  Ella Malave is a 47 year old female four days status post head injury with normal CT findings at that time, presents to the ED with worsening symptoms today. Ultimately, repeat head CT given increased severity of headache and symptoms but did not show any evidence of intercranial hemorrhage. There were small bubbles of gas in the cavernous sinus which after discussion with the radiologist was likely secondary to the peripheral IV she had four days ago rather than worsened fracture or other process and is considered benign. She had no focal neurologic deficit. She is otherwise appropriate. She is on daily Percocet, and she should continue this at home, though management of concussion and post concussive symptoms is not ideal. She should use Advil as need for pain. Follow up with PCP and concussion clinic within 2-3 days. Return to the ED with ongoing symptoms. At this time, her presentation is most consistent with postconcussion syndrome most likely due to going back to work yesterday and increased activities.     Diagnosis:    ICD-10-CM    1. Postconcussion syndrome F07.81        Disposition:  Discharged to home.     ISruthi, am serving as a scribe on 1/17/2018 at 2:37 PM to personally document services performed by Dr. Hall based on my observations and the provider's statements to me.   [facility] EMERGENCY DEPARTMENT       Mony Hall MD  01/17/18 1797

## 2018-03-09 ENCOUNTER — APPOINTMENT (OUTPATIENT)
Dept: GENERAL RADIOLOGY | Facility: CLINIC | Age: 48
End: 2018-03-09
Attending: EMERGENCY MEDICINE
Payer: COMMERCIAL

## 2018-03-09 ENCOUNTER — HOSPITAL ENCOUNTER (EMERGENCY)
Facility: CLINIC | Age: 48
Discharge: HOME | End: 2018-03-09
Attending: EMERGENCY MEDICINE | Admitting: EMERGENCY MEDICINE
Payer: COMMERCIAL

## 2018-03-09 VITALS
DIASTOLIC BLOOD PRESSURE: 94 MMHG | OXYGEN SATURATION: 98 % | HEART RATE: 87 BPM | RESPIRATION RATE: 18 BRPM | SYSTOLIC BLOOD PRESSURE: 141 MMHG | TEMPERATURE: 97.3 F

## 2018-03-09 DIAGNOSIS — S22.41XA CLOSED FRACTURE OF MULTIPLE RIBS OF RIGHT SIDE, INITIAL ENCOUNTER: ICD-10-CM

## 2018-03-09 PROCEDURE — 71046 X-RAY EXAM CHEST 2 VIEWS: CPT

## 2018-03-09 PROCEDURE — 96372 THER/PROPH/DIAG INJ SC/IM: CPT

## 2018-03-09 PROCEDURE — 25000128 H RX IP 250 OP 636: Performed by: EMERGENCY MEDICINE

## 2018-03-09 PROCEDURE — 99283 EMERGENCY DEPT VISIT LOW MDM: CPT

## 2018-03-09 PROCEDURE — 25000132 ZZH RX MED GY IP 250 OP 250 PS 637: Performed by: EMERGENCY MEDICINE

## 2018-03-09 RX ORDER — IBUPROFEN 600 MG/1
600 TABLET, FILM COATED ORAL EVERY 6 HOURS PRN
Qty: 20 TABLET | Refills: 1 | Status: SHIPPED | OUTPATIENT
Start: 2018-03-09

## 2018-03-09 RX ORDER — OXYCODONE AND ACETAMINOPHEN 5; 325 MG/1; MG/1
1 TABLET ORAL EVERY 6 HOURS PRN
Qty: 15 TABLET | Refills: 0 | Status: SHIPPED | OUTPATIENT
Start: 2018-03-09 | End: 2018-03-09

## 2018-03-09 RX ORDER — METHOCARBAMOL 750 MG/1
750 TABLET, FILM COATED ORAL ONCE
Status: COMPLETED | OUTPATIENT
Start: 2018-03-09 | End: 2018-03-09

## 2018-03-09 RX ORDER — METHOCARBAMOL 750 MG/1
750 TABLET, FILM COATED ORAL 4 TIMES DAILY PRN
Qty: 20 TABLET | Refills: 0 | Status: ON HOLD | OUTPATIENT
Start: 2018-03-09 | End: 2019-08-09

## 2018-03-09 RX ORDER — KETOROLAC TROMETHAMINE 30 MG/ML
30 INJECTION, SOLUTION INTRAMUSCULAR; INTRAVENOUS ONCE
Status: COMPLETED | OUTPATIENT
Start: 2018-03-09 | End: 2018-03-09

## 2018-03-09 RX ORDER — OXYCODONE HYDROCHLORIDE 5 MG/1
5 TABLET ORAL ONCE
Status: COMPLETED | OUTPATIENT
Start: 2018-03-09 | End: 2018-03-09

## 2018-03-09 RX ADMIN — HYDROMORPHONE HYDROCHLORIDE 1 MG: 1 INJECTION, SOLUTION INTRAMUSCULAR; INTRAVENOUS; SUBCUTANEOUS at 22:51

## 2018-03-09 RX ADMIN — OXYCODONE HYDROCHLORIDE 5 MG: 5 TABLET ORAL at 21:47

## 2018-03-09 RX ADMIN — METHOCARBAMOL 750 MG: 750 TABLET ORAL at 21:47

## 2018-03-09 RX ADMIN — KETOROLAC TROMETHAMINE 30 MG: 30 INJECTION, SOLUTION INTRAMUSCULAR at 21:57

## 2018-03-09 ASSESSMENT — ENCOUNTER SYMPTOMS: BACK PAIN: 1

## 2018-03-09 NOTE — ED AVS SNAPSHOT
[facility] Emergency Department    [address] [place] [address]    Phone:  [phone]    Fax:  [phone]                                       Ella Malave   MRN: [id number]    Department:  [facility] Emergency Department   Date of Visit:  3/9/2018           Patient Information     Date Of Birth          1970        Your diagnoses for this visit were:     Closed fracture of multiple ribs of right side, initial encounter        You were seen by Yobani Beasley MD.      Follow-up Information     Follow up with Clinic, [facility] In 3 days.    Contact information:    [address] [facility] [address]  [phone]          Discharge Instructions         Rib Fracture (Broken Rib)  Your ribs are curved bones in your chest. They help protect your lungs and expand and contract when you breathe. Children's ribs bend easily and can often withstand a blow or fall. But adult ribs are more likely to break (fracture) under stress. Even coughing or a hard sneeze can fracture a rib.    When to go to the Emergency Room (ER)  Although they can be painful, most rib fractures aren't serious. But they often make it hard to cough or breathe deeply. Get medical care right away if you have:    Trouble breathing.    Nausea, vomiting, or stomach pain with a sore or bruised rib.    Pain that worsens over time.    An injury to the chest or stomach.  What to expect in the ER  Here is what will happen in the ER:     A healthcare provider will ask about your injury and examine you carefully.    An X-ray of your chest will likely be taken to show any major damage to ribs and lungs. However, ribs can undergo small breaks that do not show up on X-rays, even though they still hurt.    You may be given medicine to ease your discomfort.    Rarely, rib fractures can cause a lung to collapse or lead to bleeding in the chest. In these cases, a tube will  be inserted into the chest to reinflate the lung or drain the blood.  Follow-up  You are likely to heal in 6 to 8 weeks. Most rib fractures heal on their own with no lasting effects. Call your healthcare provider right away if you notice any of these symptoms:    Increased chest pain    Shortness of breath    Fever    Coughing up blood  Date Last Reviewed: 9/26/2015 2000-2017 [employer]. [address] [place] [address], [place] [address]. All rights reserved. This information is not intended as a substitute for professional medical care. Always follow your healthcare professional's instructions.          24 Hour Appointment Hotline       To make an appointment at any [facility], call [phone] ([phone]). If you don't have a family doctor or clinic, we will help you find one. [place] clinics are conveniently located to serve the needs of you and your family.             Review of your medicines      START taking        Dose / Directions Last dose taken    methocarbamol 750 MG tablet   Commonly known as:  ROBAXIN   Dose:  750 mg   Quantity:  20 tablet        Take 1 tablet (750 mg) by mouth 4 times daily as needed for muscle spasms   Refills:  0          CONTINUE these medicines which may have CHANGED, or have new prescriptions. If we are uncertain of the size of tablets/capsules you have at home, strength may be listed as something that might have changed.        Dose / Directions Last dose taken    ibuprofen 600 MG tablet   Commonly known as:  ADVIL/MOTRIN   Dose:  600 mg   What changed:  reasons to take this   Quantity:  20 tablet        Take 1 tablet (600 mg) by mouth every 6 hours as needed for moderate pain   Refills:  1        * oxyCODONE-acetaminophen 5-325 MG per tablet   Commonly known as:  PERCOCET   Dose:  1-2 tablet   What changed:  Another medication with the same name was added. Make sure you understand how and when to take each.   Quantity:  10 tablet        Take 1-2 tablets  by mouth every 6 hours as needed for moderate to severe pain   Refills:  0        * oxyCODONE-acetaminophen 5-325 MG per tablet   Commonly known as:  PERCOCET   Dose:  1 tablet   What changed:  You were already taking a medication with the same name, and this prescription was added. Make sure you understand how and when to take each.   Quantity:  15 tablet        Take 1 tablet by mouth every 6 hours as needed for moderate to severe pain   Refills:  0        * Notice:  This list has 2 medication(s) that are the same as other medications prescribed for you. Read the directions carefully, and ask your doctor or other care provider to review them with you.      Our records show that you are taking the medicines listed below. If these are incorrect, please call your family doctor or clinic.        Dose / Directions Last dose taken    CITALOPRAM HYDROBROMIDE PO   Dose:  20 mg        Take 20 mg by mouth. She takes 1/2 of a 40 mg tablet   Refills:  0        GABAPENTIN PO   Dose:  900 mg        Take 900 mg by mouth 3 times daily.   Refills:  0        LEVOTHROID PO        Refills:  0        multivitamin, therapeutic with minerals Tabs tablet   Dose:  1 tablet        Take 1 tablet by mouth daily.   Refills:  0        olopatadine 0.1 % ophthalmic solution   Commonly known as:  PATANOL   Dose:  1 drop        Place 1 drop into both eyes 2 times daily.   Refills:  0                Prescriptions were sent or printed at these locations (3 Prescriptions)                   Other Prescriptions                Printed at Department/Unit printer (3 of 3)         methocarbamol (ROBAXIN) 750 MG tablet               oxyCODONE-acetaminophen (PERCOCET) 5-325 MG per tablet               ibuprofen (ADVIL/MOTRIN) 600 MG tablet                Procedures and tests performed during your visit     XR Chest 2 Views      Orders Needing Specimen Collection     None      Pending Results     No orders found from 3/7/2018 to 3/10/2018.            Pending  Culture Results     No orders found from 3/7/2018 to 3/10/2018.            Pending Results Instructions     If you had any lab results that were not finalized at the time of your Discharge, you can call the ED Lab Result RN at [phone]. You will be contacted by this team for any positive Lab results or changes in treatment. The nurses are available 7 days a week from 10A to 6:30P.  You can leave a message 24 hours per day and they will return your call.        Test Results From Your Hospital Stay        3/9/2018 10:49 PM      Narrative     CHEST TWO VIEWS   3/9/2018 10:14 PM     HISTORY: Rib pain, reported fractures.    COMPARISON: None.    FINDINGS: Fusion hardware lower cervical spine. Heart size normal.  Fractures of the right fourth, fifth, and sixth ribs with moderately  displaced fragments noted. No pneumothorax seen. No infiltrates.        Impression     IMPRESSION: Multiple moderately displaced right rib fractures.    RO SAHNI MD                Clinical Quality Measure: Blood Pressure Screening     Your blood pressure was checked while you were in the emergency department today. The last reading we obtained was  BP: 130/90 . Please read the guidelines below about what these numbers mean and what you should do about them.  If your systolic blood pressure (the top number) is less than 120 and your diastolic blood pressure (the bottom number) is less than 80, then your blood pressure is normal. There is nothing more that you need to do about it.  If your systolic blood pressure (the top number) is 120-139 or your diastolic blood pressure (the bottom number) is 80-89, your blood pressure may be higher than it should be. You should have your blood pressure rechecked within a year by a primary care provider.  If your systolic blood pressure (the top number) is 140 or greater or your diastolic blood pressure (the bottom number) is 90 or greater, you may have high blood pressure. High blood pressure is  "treatable, but if left untreated over time it can put you at risk for heart attack, stroke, or kidney failure. You should have your blood pressure rechecked by a primary care provider within the next 4 weeks.  If your provider in the emergency department today gave you specific instructions to follow-up with your doctor or provider even sooner than that, you should follow that instruction and not wait for up to 4 weeks for your follow-up visit.        Thank you for choosing [facility]       Thank you for choosing [facility] for your care. Our goal is always to provide you with excellent care. Hearing back from our patients is one way we can continue to improve our services. Please take a few minutes to complete the written survey that you may receive in the mail after you visit with us. Thank you!        [facility] Information     [employer] lets you send messages to your doctor, view your test results, renew your prescriptions, schedule appointments and more. To sign up, go to [email or web address] . Click on \"Log in\" on the left side of the screen, which will take you to the Welcome page. Then click on \"Sign up Now\" on the right side of the page.     You will be asked to enter the access code listed below, as well as some personal information. Please follow the directions to create your username and password.     Your access code is: [email or web address]  Expires: 2018  3:45 PM     Your access code will  in 90 days. If you need help or a new code, please call your [facility] clinic or [phone].        Care EveryWhere ID     This is your Care EveryWhere ID. This could be used by other organizations to access your [facility] medical records  [id number]        Equal Access to Services     [facility] SAMRA : Ced Flores, harlan swain, olaf rader. So [facility] [place] [phone].    ATENCIÓN: Si habla español, tiene a shi disposición servicios gratuitos de " asistencia lingüística. Akin al [phone].    We comply with applicable federal civil rights laws and [place] laws. We do not discriminate on the basis of race, color, national origin, age, disability, sex, sexual orientation, or gender identity.            After Visit Summary       This is your record. Keep this with you and show to your community pharmacist(s) and doctor(s) at your next visit.

## 2018-03-09 NOTE — ED AVS SNAPSHOT
[facility] Emergency Department    [address]    [facility] [address]    Phone:  [phone]    Fax:  [phone]                                       Ella Malave   MRN: [id number]    Department:  [facility] Emergency Department   Date of Visit:  3/9/2018           After Visit Summary Signature Page     I have received my discharge instructions, and my questions have been answered. I have discussed any challenges I see with this plan with the nurse or doctor.    ..........................................................................................................................................  Patient/Patient Representative Signature      ..........................................................................................................................................  Patient Representative Print Name and Relationship to Patient    ..................................................               ................................................  Date                                            Time    ..........................................................................................................................................  Reviewed by Signature/Title    ...................................................              ..............................................  Date                                                            Time

## 2018-03-09 NOTE — LETTER
March 9, 2018      To Whom It May Concern:      Ella Malave was seen in our Emergency Department today, 03/09/18.  I expect her condition to improve over the next 4-5 days.  She may return to work/school when improved on Wednesday, 03/14/2018.    Sincerely,        Leonardo De Santiago RN

## 2018-03-10 NOTE — ED PROVIDER NOTES
History     Chief Complaint:  Rib Pain     HPI   Ella Malave is a 47 year old female who presents to the emergency department today for evaluation of right sided rib pain after a mechanical fall. The patient reports that on Wednesday (03/07/2018) she fell on her stairs and hit her right hip and arm. She was seen in the [facility] on Thursday (03/08/2018) where they did xrays and informed her that her 6th and 7th ribs were fractures. She was treated with Toradol and given percocet to manage the pain, which she reports is unmanageable thus prompting her visit to the ED. She also notes a bruise on her right arm.     Allergies:  Codeine Sulfate  Darvocet [Propoxyphene N-Apap]    Medications:    Levothroid  Percocet  Advil  Celexa  Gabapentin  Patanol    Past Medical History:    Back pain  Sciatic pain    Past Surgical History:    Hysterectomy  Orthopedic surgery, knee    Family History:    History reviewed. No pertinent family history.     Social History:  Smoking Status: Current Every Day Smoker   Packs/Day: 0.75  Alcohol Use: Positive  Marital Status:  Single     Review of Systems   Musculoskeletal: Positive for back pain (Rib pain).   All other systems reviewed and are negative.    Physical Exam     Patient Vitals for the past 24 hrs:   BP Temp Temp src Pulse Resp SpO2   03/09/18 2230 130/90 - - - - 98 %   03/09/18 2215 134/84 - - - - 99 %   03/09/18 2145 123/78 - - - - 100 %   03/09/18 2122 (!) 142/93 97.3  F (36.3  C) Temporal 90 20 100 %       Physical Exam  Vital signs and nursing notes reviewed.     Constitutional: laying on lopez appears uncomfortable  HENT: Oropharynx is clear and moist  Eyes: Conjunctivae are normal bilaterally. Pupils equal  Neck: normal range of motion  Cardiovascular: Normal rate, regular rhythm, normal heart sounds.   Pulmonary/Chest: Pain with deep inspiration.  Equal breath sounds.   No respiratory distress.   Abdominal: Soft. Bowel sounds are normal. No  tenderness to palpation. No rebound or guarding.   Musculoskeletal: No joint swelling or edema.   Neurological: Alert and oriented. No focal weakness  Skin: Skin is warm and dry. No rash noted.   Psych: normal affect    Emergency Department Course   Imaging:  Radiology findings were communicated with the patient who voiced understanding of the findings.    XR Chest 2 Views  Multiple moderately displaced right rib fractures.  Reading per radiology    Interventions:  2147 Roxicodone 5 mg PO  2147 Robaxin 750 mg PO  2157 Toradol 30 mg IM  2251 Dilaudid 1 mg IM    Emergency Department Course:    2141 Nursing notes and vitals reviewed.    2148 I performed an exam of the patient as documented above.     2154 The patient was sent for a XR Chest 2 Views while in the emergency department, results above.     2318 I personally reviewed the imaging results with the patient and answered all related questions prior to discharge.    Impression & Plan      Medical Decision Making:  Ella Malave is a 47 year old female who presents to the emergency department today for evaluation of worsening right posterior rib pain. Patient recently had a fall, two days ago, and sustained rib fractures. She said she was trying to get into her loft tonight to sleep as she has been sleeping on her couch but her pain got worse and is uncontrolled so she came in. On examination, she appeared uncomfortable, her vital signs were normal, she had fairly clear lung sounds, and no other apparent injuries. I did repeated chest xray which showed no pneumothorax or other concerning findings other than the noted right posterior rib fractures. Patient was given medication with marked improvement in her symptoms, at recheck she appeared much more comfortable. I recommend that she add an anti-inflammatory and muscle relaxant to her regiment for pain control.  She was given incentive spirometer.  I advised on reasons to return. She will follow up with her  primary care physician as needed and was discharged home in good condition.    Diagnosis:    ICD-10-CM    1. Closed fracture of multiple ribs of right side, initial encounter S22.41XA      Disposition:   The patient is discharged to home.    Discharge Medications:  Discharge Medication List as of 3/9/2018 11:32 PM      START taking these medications    Details   methocarbamol (ROBAXIN) 750 MG tablet Take 1 tablet (750 mg) by mouth 4 times daily as needed for muscle spasms, Disp-20 tablet, R-0, Local Print          !! - Potential duplicate medications found. Please discuss with provider.        Scribe Disclosure:  I, Ary White, am serving as a scribe at 9:59 PM on 3/9/2018 to document services personally performed by Yobani Beasley MD based on my observations and the provider's statements to me.    [facility] EMERGENCY DEPARTMENT       Yobani Beasley MD  03/10/18 7653

## 2018-03-10 NOTE — ED NOTES
Patient stated pain is minimally improved, currently 9/10 and worst with movement. MD aware. New order received and will be carry out.

## 2018-03-10 NOTE — ED NOTES
Here with right rib pain for 2 days . Fell down 2 steps 2 days ago and was treated with Toradol and given 20 percocet . Finished the pain med at 1330 today and now here for pain control Says the fx is 6 and 7 rib

## 2018-03-10 NOTE — DISCHARGE INSTRUCTIONS
Rib Fracture (Broken Rib)  Your ribs are curved bones in your chest. They help protect your lungs and expand and contract when you breathe. Children's ribs bend easily and can often withstand a blow or fall. But adult ribs are more likely to break (fracture) under stress. Even coughing or a hard sneeze can fracture a rib.    When to go to the Emergency Room (ER)  Although they can be painful, most rib fractures aren't serious. But they often make it hard to cough or breathe deeply. Get medical care right away if you have:    Trouble breathing.    Nausea, vomiting, or stomach pain with a sore or bruised rib.    Pain that worsens over time.    An injury to the chest or stomach.  What to expect in the ER  Here is what will happen in the ER:     A healthcare provider will ask about your injury and examine you carefully.    An X-ray of your chest will likely be taken to show any major damage to ribs and lungs. However, ribs can undergo small breaks that do not show up on X-rays, even though they still hurt.    You may be given medicine to ease your discomfort.    Rarely, rib fractures can cause a lung to collapse or lead to bleeding in the chest. In these cases, a tube will be inserted into the chest to reinflate the lung or drain the blood.  Follow-up  You are likely to heal in 6 to 8 weeks. Most rib fractures heal on their own with no lasting effects. Call your healthcare provider right away if you notice any of these symptoms:    Increased chest pain    Shortness of breath    Fever    Coughing up blood  Date Last Reviewed: 9/26/2015 2000-2017 [employer]. [address] [place] [address], [place] [address]. All rights reserved. This information is not intended as a substitute for professional medical care. Always follow your healthcare professional's instructions.

## 2018-03-10 NOTE — ED NOTES
Incentive spirometer education provided to patient. Patient verbalize understanding of usage of incentive spirometer and has shown good return demonstration.

## 2019-08-08 ENCOUNTER — HOSPITAL ENCOUNTER (INPATIENT)
Facility: CLINIC | Age: 49
LOS: 1 days | Discharge: HOME | DRG: 896 | End: 2019-08-10
Attending: EMERGENCY MEDICINE | Admitting: INTERNAL MEDICINE

## 2019-08-08 DIAGNOSIS — T18.9XXA INGESTION OF FOREIGN SUBSTANCE, INITIAL ENCOUNTER: ICD-10-CM

## 2019-08-08 DIAGNOSIS — F10.921 ALCOHOL INTOXICATION, WITH DELIRIUM (H): ICD-10-CM

## 2019-08-08 DIAGNOSIS — R41.0 DELIRIUM: ICD-10-CM

## 2019-08-08 LAB
ALBUMIN SERPL-MCNC: 4.6 G/DL (ref 3.4–5)
ALP SERPL-CCNC: 74 U/L (ref 40–150)
ALT SERPL W P-5'-P-CCNC: 22 U/L (ref 0–50)
ANION GAP SERPL CALCULATED.3IONS-SCNC: 8 MMOL/L (ref 3–14)
AST SERPL W P-5'-P-CCNC: 12 U/L (ref 0–45)
BASOPHILS # BLD AUTO: 0.1 10E9/L (ref 0–0.2)
BASOPHILS NFR BLD AUTO: 0.9 %
BILIRUB SERPL-MCNC: 0.3 MG/DL (ref 0.2–1.3)
BUN SERPL-MCNC: 7 MG/DL (ref 7–30)
CALCIUM SERPL-MCNC: 9.3 MG/DL (ref 8.5–10.1)
CHLORIDE SERPL-SCNC: 102 MMOL/L (ref 94–109)
CO2 SERPL-SCNC: 26 MMOL/L (ref 20–32)
CREAT SERPL-MCNC: 0.74 MG/DL (ref 0.52–1.04)
DIFFERENTIAL METHOD BLD: NORMAL
EOSINOPHIL # BLD AUTO: 0.1 10E9/L (ref 0–0.7)
EOSINOPHIL NFR BLD AUTO: 0.6 %
ERYTHROCYTE [DISTWIDTH] IN BLOOD BY AUTOMATED COUNT: 13.2 % (ref 10–15)
ETHANOL SERPL-MCNC: 0.25 G/DL
GFR SERPL CREATININE-BSD FRML MDRD: >90 ML/MIN/{1.73_M2}
GLUCOSE SERPL-MCNC: 92 MG/DL (ref 70–99)
HCG SERPL QL: NEGATIVE
HCT VFR BLD AUTO: 44.8 % (ref 35–47)
HGB BLD-MCNC: 15.1 G/DL (ref 11.7–15.7)
IMM GRANULOCYTES # BLD: 0 10E9/L (ref 0–0.4)
IMM GRANULOCYTES NFR BLD: 0.2 %
LIPASE SERPL-CCNC: 156 U/L (ref 73–393)
LYMPHOCYTES # BLD AUTO: 3.3 10E9/L (ref 0.8–5.3)
LYMPHOCYTES NFR BLD AUTO: 36.2 %
MCH RBC QN AUTO: 31 PG (ref 26.5–33)
MCHC RBC AUTO-ENTMCNC: 33.7 G/DL (ref 31.5–36.5)
MCV RBC AUTO: 92 FL (ref 78–100)
MONOCYTES # BLD AUTO: 0.5 10E9/L (ref 0–1.3)
MONOCYTES NFR BLD AUTO: 6 %
NEUTROPHILS # BLD AUTO: 5.1 10E9/L (ref 1.6–8.3)
NEUTROPHILS NFR BLD AUTO: 56.1 %
NRBC # BLD AUTO: 0 10*3/UL
NRBC BLD AUTO-RTO: 0 /100
PLATELET # BLD AUTO: 320 10E9/L (ref 150–450)
POTASSIUM SERPL-SCNC: 3 MMOL/L (ref 3.4–5.3)
PROT SERPL-MCNC: 8.5 G/DL (ref 6.8–8.8)
RBC # BLD AUTO: 4.87 10E12/L (ref 3.8–5.2)
SODIUM SERPL-SCNC: 136 MMOL/L (ref 133–144)
WBC # BLD AUTO: 9.1 10E9/L (ref 4–11)

## 2019-08-08 PROCEDURE — 93005 ELECTROCARDIOGRAM TRACING: CPT

## 2019-08-08 PROCEDURE — 84703 CHORIONIC GONADOTROPIN ASSAY: CPT | Performed by: EMERGENCY MEDICINE

## 2019-08-08 PROCEDURE — 80320 DRUG SCREEN QUANTALCOHOLS: CPT | Performed by: EMERGENCY MEDICINE

## 2019-08-08 PROCEDURE — 85025 COMPLETE CBC W/AUTO DIFF WBC: CPT | Performed by: EMERGENCY MEDICINE

## 2019-08-08 PROCEDURE — 96376 TX/PRO/DX INJ SAME DRUG ADON: CPT

## 2019-08-08 PROCEDURE — 80053 COMPREHEN METABOLIC PANEL: CPT | Performed by: EMERGENCY MEDICINE

## 2019-08-08 PROCEDURE — 83690 ASSAY OF LIPASE: CPT | Performed by: EMERGENCY MEDICINE

## 2019-08-08 PROCEDURE — 96375 TX/PRO/DX INJ NEW DRUG ADDON: CPT

## 2019-08-08 PROCEDURE — 96365 THER/PROPH/DIAG IV INF INIT: CPT

## 2019-08-08 PROCEDURE — 99285 EMERGENCY DEPT VISIT HI MDM: CPT | Mod: 25

## 2019-08-08 ASSESSMENT — ENCOUNTER SYMPTOMS
NERVOUS/ANXIOUS: 0
AGITATION: 0
FEVER: 0

## 2019-08-09 ENCOUNTER — APPOINTMENT (OUTPATIENT)
Dept: CT IMAGING | Facility: CLINIC | Age: 49
DRG: 896 | End: 2019-08-09
Attending: EMERGENCY MEDICINE

## 2019-08-09 PROBLEM — R41.0 DELIRIUM: Status: ACTIVE | Noted: 2019-08-09

## 2019-08-09 LAB
ALBUMIN UR-MCNC: NEGATIVE MG/DL
AMPHETAMINES UR QL SCN: NEGATIVE
APAP SERPL-MCNC: <2 MG/L (ref 10–20)
APPEARANCE UR: CLEAR
BACTERIA #/AREA URNS HPF: ABNORMAL /HPF
BARBITURATES UR QL: NEGATIVE
BENZODIAZ UR QL: NEGATIVE
BILIRUB UR QL STRIP: NEGATIVE
CANNABINOIDS UR QL SCN: NEGATIVE
COCAINE UR QL: NEGATIVE
COLOR UR AUTO: ABNORMAL
CREAT SERPL-MCNC: 0.69 MG/DL (ref 0.52–1.04)
GFR SERPL CREATININE-BSD FRML MDRD: >90 ML/MIN/{1.73_M2}
GLUCOSE BLDC GLUCOMTR-MCNC: 104 MG/DL (ref 70–99)
GLUCOSE BLDC GLUCOMTR-MCNC: 111 MG/DL (ref 70–99)
GLUCOSE BLDC GLUCOMTR-MCNC: 115 MG/DL (ref 70–99)
GLUCOSE BLDC GLUCOMTR-MCNC: 94 MG/DL (ref 70–99)
GLUCOSE UR STRIP-MCNC: NEGATIVE MG/DL
HGB UR QL STRIP: NEGATIVE
HYALINE CASTS #/AREA URNS LPF: 1 /LPF (ref 0–2)
INTERPRETATION ECG - MUSE: NORMAL
KETONES UR STRIP-MCNC: NEGATIVE MG/DL
LEUKOCYTE ESTERASE UR QL STRIP: NEGATIVE
MRSA DNA SPEC QL NAA+PROBE: NEGATIVE
NITRATE UR QL: NEGATIVE
OPIATES UR QL SCN: NEGATIVE
PCP UR QL SCN: NEGATIVE
PH UR STRIP: 6 PH (ref 5–7)
PLATELET # BLD AUTO: 310 10E9/L (ref 150–450)
POTASSIUM SERPL-SCNC: 4.8 MMOL/L (ref 3.4–5.3)
RBC #/AREA URNS AUTO: 0 /HPF (ref 0–2)
SALICYLATES SERPL-MCNC: <2 MG/DL
SOURCE: ABNORMAL
SP GR UR STRIP: 1 (ref 1–1.03)
SPECIMEN SOURCE: NORMAL
T4 FREE SERPL-MCNC: 0.79 NG/DL (ref 0.76–1.46)
TSH SERPL DL<=0.005 MIU/L-ACNC: 66.35 MU/L (ref 0.4–4)
UROBILINOGEN UR STRIP-MCNC: NORMAL MG/DL (ref 0–2)
WBC #/AREA URNS AUTO: 0 /HPF (ref 0–5)

## 2019-08-09 PROCEDURE — 40000275 ZZH STATISTIC RCP TIME EA 10 MIN

## 2019-08-09 PROCEDURE — 25000128 H RX IP 250 OP 636: Performed by: INTERNAL MEDICINE

## 2019-08-09 PROCEDURE — 25000132 ZZH RX MED GY IP 250 OP 250 PS 637: Performed by: EMERGENCY MEDICINE

## 2019-08-09 PROCEDURE — 87640 STAPH A DNA AMP PROBE: CPT | Performed by: INTERNAL MEDICINE

## 2019-08-09 PROCEDURE — 82565 ASSAY OF CREATININE: CPT | Performed by: INTERNAL MEDICINE

## 2019-08-09 PROCEDURE — 93005 ELECTROCARDIOGRAM TRACING: CPT

## 2019-08-09 PROCEDURE — 80307 DRUG TEST PRSMV CHEM ANLYZR: CPT | Performed by: EMERGENCY MEDICINE

## 2019-08-09 PROCEDURE — 84443 ASSAY THYROID STIM HORMONE: CPT | Performed by: EMERGENCY MEDICINE

## 2019-08-09 PROCEDURE — 87641 MR-STAPH DNA AMP PROBE: CPT | Performed by: INTERNAL MEDICINE

## 2019-08-09 PROCEDURE — 80329 ANALGESICS NON-OPIOID 1 OR 2: CPT | Performed by: EMERGENCY MEDICINE

## 2019-08-09 PROCEDURE — 84132 ASSAY OF SERUM POTASSIUM: CPT | Performed by: INTERNAL MEDICINE

## 2019-08-09 PROCEDURE — 25000125 ZZHC RX 250: Performed by: INTERNAL MEDICINE

## 2019-08-09 PROCEDURE — 36415 COLL VENOUS BLD VENIPUNCTURE: CPT | Performed by: INTERNAL MEDICINE

## 2019-08-09 PROCEDURE — 25000128 H RX IP 250 OP 636: Performed by: EMERGENCY MEDICINE

## 2019-08-09 PROCEDURE — 84439 ASSAY OF FREE THYROXINE: CPT | Performed by: EMERGENCY MEDICINE

## 2019-08-09 PROCEDURE — 99223 1ST HOSP IP/OBS HIGH 75: CPT | Mod: AI | Performed by: INTERNAL MEDICINE

## 2019-08-09 PROCEDURE — 00000146 ZZHCL STATISTIC GLUCOSE BY METER IP

## 2019-08-09 PROCEDURE — 20000003 ZZH R&B ICU

## 2019-08-09 PROCEDURE — 81001 URINALYSIS AUTO W/SCOPE: CPT | Performed by: EMERGENCY MEDICINE

## 2019-08-09 PROCEDURE — 93010 ELECTROCARDIOGRAM REPORT: CPT | Performed by: INTERNAL MEDICINE

## 2019-08-09 PROCEDURE — 70450 CT HEAD/BRAIN W/O DYE: CPT

## 2019-08-09 PROCEDURE — 25000132 ZZH RX MED GY IP 250 OP 250 PS 637: Performed by: INTERNAL MEDICINE

## 2019-08-09 PROCEDURE — 25800030 ZZH RX IP 258 OP 636: Performed by: INTERNAL MEDICINE

## 2019-08-09 PROCEDURE — 85049 AUTOMATED PLATELET COUNT: CPT | Performed by: INTERNAL MEDICINE

## 2019-08-09 RX ORDER — LORAZEPAM 2 MG/ML
1 INJECTION INTRAMUSCULAR ONCE
Status: COMPLETED | OUTPATIENT
Start: 2019-08-09 | End: 2019-08-09

## 2019-08-09 RX ORDER — POTASSIUM CL/LIDO/0.9 % NACL 10MEQ/0.1L
10 INTRAVENOUS SOLUTION, PIGGYBACK (ML) INTRAVENOUS
Status: DISCONTINUED | OUTPATIENT
Start: 2019-08-09 | End: 2019-08-10 | Stop reason: HOSPADM

## 2019-08-09 RX ORDER — POTASSIUM CHLORIDE 7.45 MG/ML
10 INJECTION INTRAVENOUS
Status: DISCONTINUED | OUTPATIENT
Start: 2019-08-09 | End: 2019-08-10 | Stop reason: HOSPADM

## 2019-08-09 RX ORDER — CARISOPRODOL 350 MG/1
350 TABLET ORAL 3 TIMES DAILY PRN
COMMUNITY
Start: 2019-04-11

## 2019-08-09 RX ORDER — LORAZEPAM 2 MG/ML
0.5 INJECTION INTRAMUSCULAR ONCE
Status: COMPLETED | OUTPATIENT
Start: 2019-08-09 | End: 2019-08-09

## 2019-08-09 RX ORDER — ONDANSETRON 4 MG/1
4 TABLET, ORALLY DISINTEGRATING ORAL EVERY 6 HOURS PRN
Status: DISCONTINUED | OUTPATIENT
Start: 2019-08-09 | End: 2019-08-10 | Stop reason: HOSPADM

## 2019-08-09 RX ORDER — CITALOPRAM HYDROBROMIDE 20 MG/1
20 TABLET ORAL DAILY
COMMUNITY

## 2019-08-09 RX ORDER — DEXTROSE MONOHYDRATE, SODIUM CHLORIDE, AND POTASSIUM CHLORIDE 50; 1.49; 4.5 G/1000ML; G/1000ML; G/1000ML
INJECTION, SOLUTION INTRAVENOUS CONTINUOUS
Status: DISCONTINUED | OUTPATIENT
Start: 2019-08-09 | End: 2019-08-10

## 2019-08-09 RX ORDER — GABAPENTIN 300 MG/1
900 CAPSULE ORAL 3 TIMES DAILY
Status: DISCONTINUED | OUTPATIENT
Start: 2019-08-09 | End: 2019-08-10 | Stop reason: HOSPADM

## 2019-08-09 RX ORDER — OXYCODONE AND ACETAMINOPHEN 5; 325 MG/1; MG/1
1-2 TABLET ORAL EVERY 6 HOURS PRN
Status: DISCONTINUED | OUTPATIENT
Start: 2019-08-09 | End: 2019-08-10 | Stop reason: HOSPADM

## 2019-08-09 RX ORDER — FAMOTIDINE 20 MG/1
20 TABLET, FILM COATED ORAL 2 TIMES DAILY
Status: DISCONTINUED | OUTPATIENT
Start: 2019-08-09 | End: 2019-08-10 | Stop reason: HOSPADM

## 2019-08-09 RX ORDER — POTASSIUM CHLORIDE 29.8 MG/ML
20 INJECTION INTRAVENOUS
Status: DISCONTINUED | OUTPATIENT
Start: 2019-08-09 | End: 2019-08-10 | Stop reason: HOSPADM

## 2019-08-09 RX ORDER — POTASSIUM CHLORIDE 1.5 G/1.58G
20-40 POWDER, FOR SOLUTION ORAL
Status: DISCONTINUED | OUTPATIENT
Start: 2019-08-09 | End: 2019-08-10 | Stop reason: HOSPADM

## 2019-08-09 RX ORDER — LORAZEPAM 2 MG/ML
1-4 INJECTION INTRAMUSCULAR
Status: DISCONTINUED | OUTPATIENT
Start: 2019-08-09 | End: 2019-08-10 | Stop reason: HOSPADM

## 2019-08-09 RX ORDER — NALOXONE HYDROCHLORIDE 0.4 MG/ML
.1-.4 INJECTION, SOLUTION INTRAMUSCULAR; INTRAVENOUS; SUBCUTANEOUS
Status: DISCONTINUED | OUTPATIENT
Start: 2019-08-09 | End: 2019-08-10 | Stop reason: HOSPADM

## 2019-08-09 RX ORDER — ONDANSETRON 2 MG/ML
4 INJECTION INTRAMUSCULAR; INTRAVENOUS EVERY 6 HOURS PRN
Status: DISCONTINUED | OUTPATIENT
Start: 2019-08-09 | End: 2019-08-10 | Stop reason: HOSPADM

## 2019-08-09 RX ORDER — LEVOTHYROXINE SODIUM 88 UG/1
88 TABLET ORAL DAILY
COMMUNITY

## 2019-08-09 RX ORDER — OLANZAPINE 5 MG/1
5 TABLET, ORALLY DISINTEGRATING ORAL ONCE
Status: COMPLETED | OUTPATIENT
Start: 2019-08-09 | End: 2019-08-09

## 2019-08-09 RX ORDER — POTASSIUM CHLORIDE 1500 MG/1
20-40 TABLET, EXTENDED RELEASE ORAL
Status: DISCONTINUED | OUTPATIENT
Start: 2019-08-09 | End: 2019-08-10 | Stop reason: HOSPADM

## 2019-08-09 RX ADMIN — DEXMEDETOMIDINE 0.2 MCG/KG/HR: 100 INJECTION, SOLUTION, CONCENTRATE INTRAVENOUS at 07:19

## 2019-08-09 RX ADMIN — GABAPENTIN 900 MG: 300 CAPSULE ORAL at 23:56

## 2019-08-09 RX ADMIN — POTASSIUM CHLORIDE, DEXTROSE MONOHYDRATE AND SODIUM CHLORIDE: 150; 5; 450 INJECTION, SOLUTION INTRAVENOUS at 09:22

## 2019-08-09 RX ADMIN — POTASSIUM CHLORIDE, DEXTROSE MONOHYDRATE AND SODIUM CHLORIDE: 150; 5; 450 INJECTION, SOLUTION INTRAVENOUS at 19:24

## 2019-08-09 RX ADMIN — POTASSIUM CHLORIDE 40 MEQ: 1500 TABLET, EXTENDED RELEASE ORAL at 10:00

## 2019-08-09 RX ADMIN — FAMOTIDINE 20 MG: 20 TABLET ORAL at 12:11

## 2019-08-09 RX ADMIN — LORAZEPAM 2 MG: 2 INJECTION INTRAMUSCULAR; INTRAVENOUS at 10:43

## 2019-08-09 RX ADMIN — LORAZEPAM 1 MG: 2 INJECTION INTRAMUSCULAR; INTRAVENOUS at 05:25

## 2019-08-09 RX ADMIN — DEXMEDETOMIDINE 0.7 MCG/KG/HR: 100 INJECTION, SOLUTION, CONCENTRATE INTRAVENOUS at 14:41

## 2019-08-09 RX ADMIN — LORAZEPAM 4 MG: 2 INJECTION INTRAMUSCULAR; INTRAVENOUS at 09:59

## 2019-08-09 RX ADMIN — FAMOTIDINE 20 MG: 20 TABLET ORAL at 22:36

## 2019-08-09 RX ADMIN — ENOXAPARIN SODIUM 40 MG: 40 INJECTION SUBCUTANEOUS at 09:36

## 2019-08-09 RX ADMIN — LORAZEPAM 0.5 MG: 2 INJECTION INTRAMUSCULAR; INTRAVENOUS at 04:30

## 2019-08-09 RX ADMIN — OLANZAPINE 5 MG: 5 TABLET, ORALLY DISINTEGRATING ORAL at 02:00

## 2019-08-09 RX ADMIN — OXYCODONE HYDROCHLORIDE AND ACETAMINOPHEN 2 TABLET: 5; 325 TABLET ORAL at 23:57

## 2019-08-09 ASSESSMENT — ACTIVITIES OF DAILY LIVING (ADL)
ADLS_ACUITY_SCORE: 11
ADLS_ACUITY_SCORE: 16
ADLS_ACUITY_SCORE: 11

## 2019-08-09 ASSESSMENT — MIFFLIN-ST. JEOR
SCORE: 1395.75
SCORE: 1417.86

## 2019-08-09 NOTE — ED PROVIDER NOTES
Sign-out Note    Received this patient in sign-out from Dr. Mosley at 12:41 AM.  Please refer to earlier documentation detailing presenting complaints, evaluation, and ED course.  In brief, patient is being seen in the ER for alcohol intoxication.  BAL 0.24 and clinically intoxicated.  No SI or HI complaints voiced.  Labs reveal K of 3.0, which was replaced.    Recommendations from previous provider: Awaiting clinical sobriety.  Anticipate DC home    ED course under my care:    Was notified by security of the patient's attempts to crawl out of bed.  Bed had since been removed and the patient was provided a mattress on the ground.  On evaluation, the patient was quite unstable on her feet, and did sustain a ground-level fall striking the back of her head on the side of the wall.  Her speech appears tangential, and nonsensical at times, and degree of confusion seems somewhat more profound than would be expected from a 0.24 blood alcohol level.  Given concern for patient safety, and risks of ongoing falls, she was given oral Zyprexa, and placed back on the gurney in restraints.  Head CT was obtained, which reveals no evidence of acute intracranial pathology.    Patient reevaluated.  One-to-one sitter placed, and patient is easily redirectable.  1 dose of IV Ativan administered.  I discussed the case over the telephone with patient's daughter Radha at 4:45 am, who noted that upon returning home from work yesterday evening, she noted her mother to be drinking alcohol, consuming wine and a few beers.  She also reports receiving a text message from her mother stating that she was going to die and was going to take a bunch of pills.  The daughter is unsure of which pills that may have been.    Patient reevaluated.  She is awake, alert, resting comfortably, and verbally redirectable.  She appears to be actively hallucinating, reaching for objects in front of her which are not there.  She provides nonsensical answers to  questions and exhibits slurring of her speech.  She is tachycardic.  She does not exhibit clonus at the ankles.    Case discussed with poison control including patient's presenting history, ED course, and medications available to her.  Poison control felt symptoms overall most concerning for withdrawal though have recommended monitoring of the QT interval given access to Celexa. Repeat EKG with QTc of 477 ms. Levothyroxine overdose felt to be less likely to account for patient's acute symptoms as this typically is noted 1 or 2 days after the ingestion.  Tylenol, salicylate negative.  She provided IV Ativan again, with further improvements in symptoms.  Urine drug screen returned negative.  Urinalysis unremarkable.  Patient will require admission to the ICU for close monitoring and cares.  Case discussed with Dr. Siddiqui who has accepted the patient for admission.  She will require mental health evaluation once she becomes medically clear.    Disposition  Admit to ICU         Mahesh Jacome MD  08/09/19 0623       Mahesh Jacome MD  08/09/19 7735

## 2019-08-09 NOTE — PLAN OF CARE
Bilateral wrist restraints continued 8/9/2019. Started in ED    Clinical Justification: Pulling lines, pulling tubes, and pulling equipment  Less Restrictive Alternative: 1:1 patient care, Repositioning, Reorientation, De-escalation, Disguise equipment  Attending Physician Notified: Yes, Attending Physician's Name: MD Karimi   New orders placed - MD stated he will order instead of Violent Restraints   Length of Order: 1 Day      Norma Sanchez

## 2019-08-09 NOTE — PLAN OF CARE
ICU End of Shift Summary.  For vital signs and complete assessments, please see documentation flowsheets.     Pertinent assessments: Pt confused to time and situation. Illogical with speech or not answering questions at all. Precedex gtt now 0.6. CIWAs 1-15. Total of 6mg IV Ativan utilized. When pt was admitted from ED she was very restless, trying to get out of bed and continuously sitting up. Sitter at bedside for safety. Bilateral wrist restraints were utilized; however, have since been removed as pt is more calm. Incont with urine. Denies pain. Tele ST/SR. 99% RA. Other VSS. Sleeping most of the afternoon. K replaced orally, otherwise refusing food/drink. POC reviewed with pt and daughter.     Poison control called x2 this shift. First time stated that we did not need a repeat EKG, now requesting another one before morning. Paged MD to order.   Major Shift Events: Sitter at bedside for safety.   Plan (Upcoming Events): Continue to monitor closely in ICU.   Discharge/Transfer Needs: TBD    Bedside Shift Report Completed : yes  Bedside Safety Check Completed: yes

## 2019-08-09 NOTE — PHARMACY-ADMISSION MEDICATION HISTORY
Admission medication history interview status for this patient is complete. See [facility] admission navigator for allergy information, prior to admission medications and immunization status.     Medication history resources (including written lists, pill bottles, clinic record): fill history from primary pharmacy  Primary pharmacy: [id number] - [place], [place] - [address] [place] [address]    Changes made to PTA medication list:  Added: carisoprodol  Deleted: olopatadine eye drops  Changed: citalopram (changed from 40 mg to 20 mg tablet)    Actions taken by pharmacist (provider contacted, etc):None   Additional medication history information: unable to interview patient d/t altered mental status; consider interviewing patient once able  Medication reconciliation/reorder completed by provider prior to medication history? No    Prior to Admission medications    Medication Sig Last Dose Taking? Auth Provider   carisoprodol (SOMA) 350 MG tablet Take 350 mg by mouth 3 times daily as needed for muscle spasms Past Month at Unknown time Yes Unknown, Entered By History   citalopram (CELEXA) 20 MG tablet Take 20 mg by mouth daily Past Month at Unknown time Yes Unknown, Entered By History   gabapentin (NEURONTIN) 300 MG capsule Take 900 mg by mouth 3 times daily  Past Month at LF #270 on 7/23/19 Yes Unknown, Entered By History   levothyroxine (SYNTHROID/LEVOTHROID) 88 MCG tablet Take 88 mcg by mouth daily Past Month at Unknown time Yes Unknown, Entered By History   multivitamin, therapeutic with minerals (THERA-VIT-M) TABS Take 1 tablet by mouth daily. Past Month at Unknown time Yes Unknown, Entered By History   oxyCODONE-acetaminophen (PERCOCET) 5-325 MG per tablet Take 1-2 tablets by mouth every 6 hours as needed for moderate to severe pain Past Month at LF #150 on 7/25/19 Yes Radha Minaya MD   ibuprofen (ADVIL/MOTRIN) 600 MG tablet Take 1 tablet (600 mg) by mouth every 6 hours as needed for moderate pain More than a month at  Unknown time  Yobani Beasley MD

## 2019-08-09 NOTE — ED NOTES
[facility]  ED Nurse Handoff Report    Ella Malave is a 48 year old female   ED Chief complaint: Alcohol Intoxication  . ED Diagnosis:   Final diagnoses:   Delirium   Ingestion of foreign substance, initial encounter   Alcohol intoxication, with delirium (H)     Allergies:   Allergies   Allergen Reactions     Codeine Sulfate      Darvocet [Propoxyphene N-Apap]        Code Status: Full Code  Activity level - Baseline/Home:  Independent. Activity Level - Current:   Assist X 1. Lift room needed: No. Bariatric: No   Needed: No   Isolation: No. Infection: Not Applicable.     Vital Signs:   Vitals:    08/09/19 0505 08/09/19 0510 08/09/19 0515 08/09/19 0526   BP:    (!) 139/91   Pulse:    119   Resp:       Temp:       TempSrc:       SpO2: 94% 95% 91% 98%       Cardiac Rhythm:  ,      Pain level:    Patient confused: Yes. Patient Falls Risk: Yes.   Elimination Status: Has voided   Patient Report - Initial Complaint: Pt brought into ER for increased ETOH, daughter called and was concerned for her mother. Focused Assessment: Pt with ETOH of .25 pt hallucinating and confused, did have 5 point restraints on at one point for pts safety. meds were given pt more coopertive  Dr spoke with pts daughter later and was told that the pt may have taken an right eye of meds with possible suicidal ideations  Tests Performed: Labs EKG Head CT UA. Abnormal Results: TSH 66.35 K 3.0, ETOH 0.25, NEG UTOX.   Treatments provided: meds and restraints  Family Comments: Here by herself  OBS brochure/video discussed/provided to patient:  No  ED Medications:   Medications   0.9% sodium chloride BOLUS (has no administration in time range)   OLANZapine zydis (zyPREXA) ODT tab 5 mg (5 mg Oral Given 8/9/19 0200)   LORazepam (ATIVAN) injection 0.5 mg (0.5 mg Intravenous Given 8/9/19 0430)   LORazepam (ATIVAN) injection 1 mg (1 mg Intravenous Given 8/9/19 0525)     Drips infusing:  No  For the majority of the shift, the  patient's behavior Yellow. Interventions performed were Restraints.     Severe Sepsis OR Septic Shock Diagnosis Present: No      ED Nurse Name/Phone Number: Patricia Samayoa,   6:11 AM

## 2019-08-09 NOTE — H&P
[facility] History and Physical    Ella Malave MRN# [id number]   Age: 48 year old YOB: 1970     Date of Admission:  8/8/2019    Home clinic: Clinic, [facility] [place]          Assessment and Plan:   Assessment:   Ella Malave is a 48-year-old woman who was brought to the emergency department by EMS at the request of her daughter because she was intoxicated.  She was first seen by Dr. Mosley and subsequently by Dr. Jacome who gave me signout.    The patient has a history of polysubstance abuse though it is not clear to me what degree of alcohol abuse or narcotic abuse is ongoing.  She has apparently tried to commit suicide in the past using medications as well.    Initially in the emergency department, the patient was thought to be intoxicated with alcohol alone.  She became more and more confused as the night went on.  When Dr. Jacome received her, he was concerned for additional issues and spoke with the patient's daughter by telephone.  Apparently the patient had sent a text to her daughter indicating that she may have had intention of hurting herself.  For that reason, he spoke with poison control and continued monitoring her through several hours into the early morning.  At that time that really he signed her out to me, she was obviously delirious and hallucinating.  Unfortunately, this limits the capacity to discover additional information.    Because the patient appeared to be alert enough to protect her airway, I did not think it the patient was indicated.  I believe that dexmedetomidine will give us the best control of her delirium at this time.  Abilify may be a reasonable choice as well though all the other antipsychotics will prolong the QT interval.    Diagnoses:  1.  Delirium.  Unclear etiology though alcohol withdrawal syndrome is suspected.  The possibility of other agents is also considered but history is not clear.  2.  Alcohol intoxication.  Initial  "blood alcohol level 0.24 when patient presented the evening prior to admission.  3.  Reported chronic pain with drug-seeking behaviors, doctor shopping.  4.  History of chemical dependency, previous overdoses.  5.  Reported history of depression.      Plan:   1.  Admit to the ICU.  2.  Continue dexmedetomidine drip.  3.  Serial EKGs have been recommended by poison control.  I believe there was concern for QT interval prolongation in the setting of possible exposure to Celexa.  4.  Consider psychiatric evaluation.             Chief Complaint:   Confusion following alcohol intoxication.     I was unable to get any meaningful information from the patient who is mumbling incoherently.  She does not even seem to answer yes/no questions appropriately.  I spoke with Dr. Jacome from the emergency department who evidently called the patient's daughter at around 0445 this morning.  Please see Dr. blake emergency department documentation for record of that conversation.  In addition I reviewed the electronic medical record.    Ms. Malave reportedly was brought to the emergency department due to concern on the part of her daughter about the patient's alcohol intoxication.  She was first seen in the emergency department by Dr.Lindsey Mosley at which time she documents \"unable to obtain history due to intoxication\".  Apparently the patient denied taking anything other than alcohol at that time.    On first presenting to the emergency department, the BAL measured at 0.24.  She was put on a health officer hold and was signed out to Dr. Mahesh Jacome.  Apparently the patient was trying to crawl out of bed so they put a mattress on the floor for her.  She evidently was unable to stand up to ambulate and bumped her head on the wall.  He initially described her speech is tangential and noted that she was \"picking at the air\".  She was given a dose of Zyprexa and was sent for head CT which was negative for acute " pathology.    Evidently the patient was fairly redirectable and was given a dose of Ativan for suspected withdrawal.  The emergency room provider spoke with the patient's daughter by phone as well as with poison control.  They indicated that ongoing monitoring for safety to include serial EKGs to watch the QTc interval for possible prolongation.        Past Medical History:   Chronic low back pain for which the patient has had a controlled substance agreement  Cervical spinal stenosis  Hypothyroidism  Chronic low back pain  Gastroesophageal reflux disease  Anxiety disorder/depression.  History of suicide attempts with antihistamines.  Drug-seeking behavior         Past Surgical History:      Past Surgical History:   Procedure Laterality Date     GYN SURGERY  hyst     ORTHOPEDIC SURGERY  knee             Social History:     Social History     Tobacco Use     Smoking status: Current Every Day Smoker     Packs/day: 0.75     Years: 20.00     Pack years: 15.00     Types: Cigarettes     Smokeless tobacco: Never Used   Substance Use Topics     Alcohol use: Yes             Family History:   Mother committed suicide when the patient was 10.         Allergies:     Allergies   Allergen Reactions     Codeine Sulfate      Darvocet [Propoxyphene N-Apap]              Medications:   Citalopram  Gabapentin  Levothyroxine  Robaxin         Review of Systems:   Review of systems is not obtainable due to patient factors - mental status           Physical Exam:   Vitals were reviewed  Temp: 98  F (36.7  C) Temp src: Tympanic BP: 131/59 Pulse: 71 Heart Rate: 110 Resp: 20 SpO2: 96 % O2 Device: None (Room air)    Constitutional: Alert, and directable.  Patient is babbling incoherently.  Does not respond directly to any questions.  Eyes: Lids and lashes normal, extra ocular muscles intact.  ENT: Normocephalic, without obvious abnormality, atraumatic.  No obvious facial muscular asymmetry.  Neck: Supple, symmetrical, trachea midline, no  adenopathy, thyroid symmetric, not enlarged and no tenderness, skin normal.  Hematologic / Lymphatic: No cervical lymphadenopathy and no supraclavicular lymphadenopathy.  Lungs: No increased work of breathing, good air exchange, clear to auscultation bilaterally, no crackles or wheezing.  Cardiovascular: Regular rate and rhythm, normal S1 and S2, no S3 or S4, and no murmur noted.  Abdomen: No scars, normal bowel sounds, soft, non-distended, non-tender, no masses palpated, no hepatosplenomegaly.  Musculoskeletal: No redness, warmth, or swelling of the joints.  Strength appears symmetric and intact.  Neurologic: Arousable but unable to be evaluated.  Skin: No rashes, erythema, pallor, petechia or purpura.  Warm and dry.         Data:     Results for orders placed or performed during the hospital encounter of 08/08/19 (from the past 24 hour(s))   CBC with platelets differential   Result Value Ref Range    WBC 9.1 4.0 - 11.0 10e9/L    RBC Count 4.87 3.8 - 5.2 10e12/L    Hemoglobin 15.1 11.7 - 15.7 g/dL    Hematocrit 44.8 35.0 - 47.0 %    MCV 92 78 - 100 fl    MCH 31.0 26.5 - 33.0 pg    MCHC 33.7 31.5 - 36.5 g/dL    RDW 13.2 10.0 - 15.0 %    Platelet Count 320 150 - 450 10e9/L    Diff Method Automated Method     % Neutrophils 56.1 %    % Lymphocytes 36.2 %    % Monocytes 6.0 %    % Eosinophils 0.6 %    % Basophils 0.9 %    % Immature Granulocytes 0.2 %    Nucleated RBCs 0 0 /100    Absolute Neutrophil 5.1 1.6 - 8.3 10e9/L    Absolute Lymphocytes 3.3 0.8 - 5.3 10e9/L    Absolute Monocytes 0.5 0.0 - 1.3 10e9/L    Absolute Eosinophils 0.1 0.0 - 0.7 10e9/L    Absolute Basophils 0.1 0.0 - 0.2 10e9/L    Abs Immature Granulocytes 0.0 0 - 0.4 10e9/L    Absolute Nucleated RBC 0.0    Comprehensive metabolic panel   Result Value Ref Range    Sodium 136 133 - 144 mmol/L    Potassium 3.0 (L) 3.4 - 5.3 mmol/L    Chloride 102 94 - 109 mmol/L    Carbon Dioxide 26 20 - 32 mmol/L    Anion Gap 8 3 - 14 mmol/L    Glucose 92 70 - 99 mg/dL     Urea Nitrogen 7 7 - 30 mg/dL    Creatinine 0.74 0.52 - 1.04 mg/dL    GFR Estimate >90 >60 mL/min/[1.73_m2]    GFR Estimate If Black >90 >60 mL/min/[1.73_m2]    Calcium 9.3 8.5 - 10.1 mg/dL    Bilirubin Total 0.3 0.2 - 1.3 mg/dL    Albumin 4.6 3.4 - 5.0 g/dL    Protein Total 8.5 6.8 - 8.8 g/dL    Alkaline Phosphatase 74 40 - 150 U/L    ALT 22 0 - 50 U/L    AST 12 0 - 45 U/L   Lipase   Result Value Ref Range    Lipase 156 73 - 393 U/L   Alcohol ethyl   Result Value Ref Range    Ethanol g/dL 0.25 (H) <0.01 g/dL   HCG QUALitative pregnancy (blood)   Result Value Ref Range    HCG Qualitative Serum Negative NEG^Negative   EKG 12-lead, tracing only   Result Value Ref Range    Interpretation ECG Click View Image link to view waveform and result    Salicylate level   Result Value Ref Range    Salicylate Level <2 mg/dL   Acetaminophen level   Result Value Ref Range    Acetaminophen Level <2 mg/L   TSH   Result Value Ref Range    TSH 66.35 (H) 0.40 - 4.00 mU/L   T4 free   Result Value Ref Range    T4 Free 0.79 0.76 - 1.46 ng/dL   Head CT w/o contrast    Narrative    EXAM: CT HEAD W/O CONTRAST  LOCATION: [facility]  DATE/TIME: 8/9/2019 2:42 AM    INDICATION: Head injury  COMPARISON: 1/17/2018  TECHNIQUE: Routine without IV contrast. Multiplanar reformats. Dose reduction techniques were used.    FINDINGS:  INTRACRANIAL CONTENTS: No intracranial hemorrhage, extraaxial collection, or mass effect.  No CT evidence of acute infarct. Mild cortical atrophy frontal lobes bilaterally. The ventricles and sulci are normal for age.     VISUALIZED ORBITS/SINUSES/MASTOIDS: No significant orbital abnormality. No significant paranasal sinus mucosal disease. No significant middle ear or mastoid effusion.    BONES/SOFT TISSUES: Mild soft tissue swelling/hematoma overlying right frontal bone.      Impression    CONCLUSION:  1.  No hemorrhage, mass, or mass effect.   Drug abuse screen 77 urine   Result Value Ref Range    Amphetamine  Qual Urine Negative NEG^Negative    Barbiturates Qual Urine Negative NEG^Negative    Benzodiazepine Qual Urine Negative NEG^Negative    Cannabinoids Qual Urine Negative NEG^Negative    Cocaine Qual Urine Negative NEG^Negative    Opiates Qualitative Urine Negative NEG^Negative    PCP Qual Urine Negative NEG^Negative   UA with Microscopic   Result Value Ref Range    Color Urine Straw     Appearance Urine Clear     Glucose Urine Negative NEG^Negative mg/dL    Bilirubin Urine Negative NEG^Negative    Ketones Urine Negative NEG^Negative mg/dL    Specific Gravity Urine 1.004 1.003 - 1.035    Blood Urine Negative NEG^Negative    pH Urine 6.0 5.0 - 7.0 pH    Protein Albumin Urine Negative NEG^Negative mg/dL    Urobilinogen mg/dL Normal 0.0 - 2.0 mg/dL    Nitrite Urine Negative NEG^Negative    Leukocyte Esterase Urine Negative NEG^Negative    Source Midstream Urine     WBC Urine 0 0 - 5 /HPF    RBC Urine 0 0 - 2 /HPF    Bacteria Urine Few (A) NEG^Negative /HPF    Hyaline Casts 1 0 - 2 /LPF   EKG 12 lead   Result Value Ref Range    Interpretation ECG Click View Image link to view waveform and result       EKG results:   Performed on admission        Normal sinus rhythm, normal axis, no acute ischemic ST segment or T wave changes.  QTc is recorded at 477 msec      All imaging studies reviewed by me.      Attestation:  I have reviewed today's vital signs, notes, medications, labs and imaging.  Total time: 35 minutes     Kana Siddiqui MD

## 2019-08-09 NOTE — ED NOTES
Bed: ED20  Expected date: 8/8/19  Expected time: 9:39 PM  Means of arrival: Ambulance  Comments:  LISETTE

## 2019-08-09 NOTE — ED NOTES
Pt appears more agitated  Room cleared of bed and other supplies, only mattress left in room, pt sitting in the corner, pt had also urinated on herself so pt cleaned and new scrubs and attend put on pt.

## 2019-08-09 NOTE — ED NOTES
Pt cont to require constant observation  pt tries to get out of bed pt is redirectable but requires redirection every 30-45 sec So we placed soft restraints on pt to remind her to stay in bed so she cant injure herself

## 2019-08-09 NOTE — ED TRIAGE NOTES
Patient presents via EMS from home, daughter called because she was for how much she had been drinking and how much she was drinking, patient has HX of depression, PD cuffed and placed patient on hold due to her inability to cooperate,

## 2019-08-09 NOTE — ED NOTES
Patient toileted at the bedside and offered water. Transferred into room 3 for closer monitoring on the precedex gtt. VSS. Spoke with daughter on the phone and gave update.

## 2019-08-09 NOTE — PROGRESS NOTES
Admitted earlier for mental status changes with EtOH intoxication.  H&P reviewed.  Currently on intravenous Precedex.  CIWA protocol ordered.  Has a bedside  as patient remains very impulsive with mental status changes and earlier delirium.  Continue ICU care, psychiatric evaluation the next day once mental state is improving  Most recent EKG showing normal QTC    Trev
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
## 2019-08-10 VITALS
HEART RATE: 79 BPM | HEIGHT: 66 IN | WEIGHT: 166.67 LBS | DIASTOLIC BLOOD PRESSURE: 79 MMHG | OXYGEN SATURATION: 96 % | BODY MASS INDEX: 26.79 KG/M2 | RESPIRATION RATE: 16 BRPM | TEMPERATURE: 99 F | SYSTOLIC BLOOD PRESSURE: 125 MMHG

## 2019-08-10 LAB
ALBUMIN SERPL-MCNC: 3.9 G/DL (ref 3.4–5)
ALP SERPL-CCNC: 56 U/L (ref 40–150)
ALT SERPL W P-5'-P-CCNC: 16 U/L (ref 0–50)
ANION GAP SERPL CALCULATED.3IONS-SCNC: 5 MMOL/L (ref 3–14)
AST SERPL W P-5'-P-CCNC: 12 U/L (ref 0–45)
BILIRUB SERPL-MCNC: 0.6 MG/DL (ref 0.2–1.3)
BUN SERPL-MCNC: 9 MG/DL (ref 7–30)
CALCIUM SERPL-MCNC: 8.5 MG/DL (ref 8.5–10.1)
CHLORIDE SERPL-SCNC: 110 MMOL/L (ref 94–109)
CO2 SERPL-SCNC: 23 MMOL/L (ref 20–32)
CREAT SERPL-MCNC: 0.77 MG/DL (ref 0.52–1.04)
ERYTHROCYTE [DISTWIDTH] IN BLOOD BY AUTOMATED COUNT: 13.6 % (ref 10–15)
GFR SERPL CREATININE-BSD FRML MDRD: >90 ML/MIN/{1.73_M2}
GLUCOSE BLDC GLUCOMTR-MCNC: 109 MG/DL (ref 70–99)
GLUCOSE BLDC GLUCOMTR-MCNC: 120 MG/DL (ref 70–99)
GLUCOSE BLDC GLUCOMTR-MCNC: 121 MG/DL (ref 70–99)
GLUCOSE SERPL-MCNC: 104 MG/DL (ref 70–99)
HCT VFR BLD AUTO: 41.3 % (ref 35–47)
HGB BLD-MCNC: 13.7 G/DL (ref 11.7–15.7)
MCH RBC QN AUTO: 31.1 PG (ref 26.5–33)
MCHC RBC AUTO-ENTMCNC: 33.2 G/DL (ref 31.5–36.5)
MCV RBC AUTO: 94 FL (ref 78–100)
PLATELET # BLD AUTO: 275 10E9/L (ref 150–450)
POTASSIUM SERPL-SCNC: 4 MMOL/L (ref 3.4–5.3)
PROT SERPL-MCNC: 7 G/DL (ref 6.8–8.8)
RBC # BLD AUTO: 4.41 10E12/L (ref 3.8–5.2)
SODIUM SERPL-SCNC: 138 MMOL/L (ref 133–144)
WBC # BLD AUTO: 8.2 10E9/L (ref 4–11)

## 2019-08-10 PROCEDURE — 99239 HOSP IP/OBS DSCHRG MGMT >30: CPT | Performed by: INTERNAL MEDICINE

## 2019-08-10 PROCEDURE — 99221 1ST HOSP IP/OBS SF/LOW 40: CPT | Performed by: PSYCHIATRY & NEUROLOGY

## 2019-08-10 PROCEDURE — 80053 COMPREHEN METABOLIC PANEL: CPT | Performed by: INTERNAL MEDICINE

## 2019-08-10 PROCEDURE — 00000146 ZZHCL STATISTIC GLUCOSE BY METER IP

## 2019-08-10 PROCEDURE — 25800030 ZZH RX IP 258 OP 636: Performed by: INTERNAL MEDICINE

## 2019-08-10 PROCEDURE — 25000132 ZZH RX MED GY IP 250 OP 250 PS 637: Performed by: INTERNAL MEDICINE

## 2019-08-10 PROCEDURE — 85027 COMPLETE CBC AUTOMATED: CPT | Performed by: INTERNAL MEDICINE

## 2019-08-10 PROCEDURE — 36415 COLL VENOUS BLD VENIPUNCTURE: CPT | Performed by: INTERNAL MEDICINE

## 2019-08-10 RX ORDER — CITALOPRAM HYDROBROMIDE 20 MG/1
20 TABLET ORAL DAILY
Status: DISCONTINUED | OUTPATIENT
Start: 2019-08-10 | End: 2019-08-10 | Stop reason: HOSPADM

## 2019-08-10 RX ADMIN — OXYCODONE HYDROCHLORIDE AND ACETAMINOPHEN 2 TABLET: 5; 325 TABLET ORAL at 11:04

## 2019-08-10 RX ADMIN — GABAPENTIN 900 MG: 300 CAPSULE ORAL at 08:04

## 2019-08-10 RX ADMIN — POTASSIUM CHLORIDE, DEXTROSE MONOHYDRATE AND SODIUM CHLORIDE: 150; 5; 450 INJECTION, SOLUTION INTRAVENOUS at 05:20

## 2019-08-10 RX ADMIN — OXYCODONE HYDROCHLORIDE AND ACETAMINOPHEN 2 TABLET: 5; 325 TABLET ORAL at 06:14

## 2019-08-10 RX ADMIN — FAMOTIDINE 20 MG: 20 TABLET ORAL at 08:04

## 2019-08-10 ASSESSMENT — ACTIVITIES OF DAILY LIVING (ADL)
ADLS_ACUITY_SCORE: 11
ADLS_ACUITY_SCORE: 10.5
ADLS_ACUITY_SCORE: 10.5
ADLS_ACUITY_SCORE: 11

## 2019-08-10 ASSESSMENT — MIFFLIN-ST. JEOR: SCORE: 1402.75

## 2019-08-10 NOTE — PROVIDER NOTIFICATION
1044: Patient requesting pain medications. Percocet not available until noon. Patient states at home it is ordered q4h not q6h, though med rec indicates otherwise. Please advise. Thanks.     1100: See provider note/response charted at 1100.

## 2019-08-10 NOTE — PROGRESS NOTES
[facility]  Hospitalist Progress Note  Eladio Karimi MD, MD 08/10/2019  (Text Page)  Reason for Stay (Diagnosis): Delirium, severe EtOH intoxication         Assessment and Plan:      Summary of Stay: Ella Malave is a 48 year old female with known prior history of chemical dependency, misuse of prescription drugs, reportedly had some drug-seeking behavior, chronic pain, admitted on 8/8/2019 with mental status changes found with EtOH intoxication and some concerns if she had some intention of self-harm with her prescription medications.    Problem List:   1. Delirium, metabolic encephalopathy, severe EtOH intoxication  2. No ongoing signs and symptoms of EtOH withdrawals  3. Known history of chemical dependency with previous overdoses  4. Chart review showed known chronic pain with drug-seeking behaviors and doctor shopping.  5. History of depression    She is off IV Precedex.  She is not scoring with CIWA protocol.  She is awake, alert, conversant, interactive and pleasant.  She is denying any symptomatology.  She stated that she has no suicidal intent/ideations nor homicidal ideations.  Tolerating oral diet.  No reported nausea or vomiting  Formal psychiatric evaluation requested.  Disposition pending with psychiatric input.  If no further plans of psychiatric management patient is medically optimized and will be ready for discharge.    DVT Prophylaxis: Pneumatic Compression Devices  Code Status: Full Code  Discharge Dispo: rTo be determined  Estimated Disch Date / # of Days until Disch: Likely discharge from medical perspective today    Transfer orders out of the ICU in place  Keep bedside  until seen by psych service      Interval History (Subjective):      Continuing care today.  Seen and examined.  Chart reviewed.  Case discussed with nursing service.  Usha appears to be much better she is denying any ongoing complaints.  She is not requiring any benzodiazepines and  "not scoring on CIWA protocol.  She is off IV Precedex.  Tolerating oral diet.  No reported nausea, vomiting, chest pain, shortness of breath.  Remain afebrile.  Pleasant, conversant and interactive.  Denies any suicidal nor homicidal ideations.         Physical Exam:      Last Vital Signs:  /79 (BP Location: Left arm)   Pulse 79   Temp 99  F (37.2  C) (Oral)   Resp 16   Ht 1.676 m (5' 6\")   Wt 75.6 kg (166 lb 10.7 oz)   LMP 06/20/2009   SpO2 96%   BMI 26.90 kg/m      I/O last 3 completed shifts:  In: 2701 [P.O.:450; I.V.:2251]  Out: -   Wt Readings from Last 1 Encounters:   08/10/19 75.6 kg (166 lb 10.7 oz)     Vitals:    08/09/19 0645 08/09/19 0854 08/10/19 0817   Weight: 77.1 kg (170 lb) 74.9 kg (165 lb 2 oz) 75.6 kg (166 lb 10.7 oz)       Constitutional: Awake, alert, cooperative, no apparent distress   Respiratory: Clear to auscultation bilaterally, no crackles or wheezing   Cardiovascular: Regular rate and rhythm, normal S1 and S2, and no murmur noted   Abdomen: Normal bowel sounds, soft, non-distended, non-tender   Skin: No rashes, no cyanosis, dry to touch   Neuro: Alert and oriented x3, no weakness, spontaneous and coherent speech   Extremities: No edema, normal range of motion   Other(s): Euthymic mood, not agitated       All other systems: Negative          Medications:      All current medications were reviewed with changes reflected in problem list.         Data:      All new lab and imaging data was reviewed.   Labs:  No results for input(s): CULT in the last 168 hours.  Recent Labs   Lab 08/10/19  0534 08/09/19  1136 08/08/19  2224   WBC 8.2  --  9.1   HGB 13.7  --  15.1   HCT 41.3  --  44.8   MCV 94  --  92    310 320     Recent Labs   Lab 08/10/19  0534 08/09/19  1610 08/09/19  1136 08/08/19  2224     --   --  136   POTASSIUM 4.0 4.8  --  3.0*   CHLORIDE 110*  --   --  102   CO2 23  --   --  26   ANIONGAP 5  --   --  8   *  --   --  92   BUN 9  --   --  7   CR 0.77  " --  0.69 0.74   GFRESTIMATED >90  --  >90 >90   GFRESTBLACK >90  --  >90 >90   JOHN 8.5  --   --  9.3   PROTTOTAL 7.0  --   --  8.5   ALBUMIN 3.9  --   --  4.6   BILITOTAL 0.6  --   --  0.3   ALKPHOS 56  --   --  74   AST 12  --   --  12   ALT 16  --   --  22     No results for input(s): SED, CRP in the last 168 hours.  Recent Labs   Lab 08/10/19  0751 08/10/19  0534 08/10/19  0350 08/09/19  2350 08/09/19  2002 08/09/19  1556  08/08/19  2224   GLC  --  104*  --   --   --   --   --  92   *  --  109* 121* 111* 104*   < >  --     < > = values in this interval not displayed.     No results for input(s): TROPONIN, TROPI, TROPR in the last 168 hours.    Invalid input(s): TROP, TROPONINIES  Recent Labs   Lab 08/09/19  0511   COLOR Straw   APPEARANCE Clear   URINEGLC Negative   URINEBILI Negative   URINEKETONE Negative   SG 1.004   UBLD Negative   URINEPH 6.0   PROTEIN Negative   NITRITE Negative   LEUKEST Negative   RBCU 0   WBCU 0      Imaging:   Results for orders placed or performed during the hospital encounter of 08/08/19   Head CT w/o contrast    Narrative    EXAM: CT HEAD W/O CONTRAST  LOCATION: [facility]  DATE/TIME: 8/9/2019 2:42 AM    INDICATION: Head injury  COMPARISON: 1/17/2018  TECHNIQUE: Routine without IV contrast. Multiplanar reformats. Dose reduction techniques were used.    FINDINGS:  INTRACRANIAL CONTENTS: No intracranial hemorrhage, extraaxial collection, or mass effect.  No CT evidence of acute infarct. Mild cortical atrophy frontal lobes bilaterally. The ventricles and sulci are normal for age.     VISUALIZED ORBITS/SINUSES/MASTOIDS: No significant orbital abnormality. No significant paranasal sinus mucosal disease. No significant middle ear or mastoid effusion.    BONES/SOFT TISSUES: Mild soft tissue swelling/hematoma overlying right frontal bone.      Impression    CONCLUSION:  1.  No hemorrhage, mass, or mass effect.

## 2019-08-10 NOTE — PLAN OF CARE
Reviewed discharge instructions and with patient, no new medications prescribed for discharge. Patient to follow up with primary in two days regarding hospital stay and any needed medication changes. Sitter escorted patient downstairs to meet her daughter who will be driving home.

## 2019-08-10 NOTE — PROGRESS NOTES
No changes with patient oral pain medications.  No plans in providing new scripts for narcotics upon discharge.
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
## 2019-08-10 NOTE — CONSULTS
See dictation. #[id number]  Psychiatry   Initial Consultation  Patient seen, notes and record reviewed. Patient is not holdable, contracts for safety and for no further alcohol use. No evidence of overdose of pills. She declines therapy referral, will see her PMD prn. Continue celexa as ordered.  She is aware of the risk of continued drinking on opiods especially with concomittant use of gabapentin and soma. Call Prn.   Ignacia Mcknight MD  8/10/2019

## 2019-08-10 NOTE — PLAN OF CARE
ICU End of Shift Summary.  For vital signs and complete assessments, please see documentation flowsheets.     Pertinent assessments: A&Ox4. VSS. Denies pain. CIWA 0. RASS 0. Discontinued IVF per order. Up independently. Drinking/eating. Sitter remains until psyc consult. Transferred pt to MS6 with all belongings. Report given to MS6 RN.   Major Shift Events: transfer to floor  Plan (Upcoming Events):  transfer to floor  Discharge/Transfer Needs: TBD    Bedside Shift Report Completed : yes  Bedside Safety Check Completed: yes

## 2019-08-10 NOTE — PLAN OF CARE
"ICU End of Shift Summary.  For vital signs and complete assessments, please see documentation flowsheets.     Pertinent assessments: Patient A&O. CIWA 0 to 2 this shift. Precedex titrated down and now off since 0118. No Ativan given overnight. VSS. Tele SR-SBrady. Afebrile. LS CL on RA. Up to bedside commode with assistance; good UOP. C/o chronic pain to lower back rated 8/10. Tele hub MD notified and home PRN Percocet and scheduled gabapentin ordered and given per orders. Patient slept well overnight.  in room at all times.     Major Shift Events: Patient denies attempting suicide before admission. Patient states that she took \"7-8 Benadryl so that I could sleep. I had not slept well for a couple days.\" Patient denies any suicidal ideation to writer. Patient states she wants to be discharged home today because she does not have insurance and cannot afford a hospital bill. Patient states she needs to be home to go to work Monday morning.   Plan (Upcoming Events): Continue plan of care. Monitor CIWAs.   Discharge/Transfer Needs: TBD    Bedside Shift Report Completed :   Bedside Safety Check Completed:     "

## 2019-08-10 NOTE — PROGRESS NOTES
Writer spoke with Poison Control re: recent EKG. No new orders recommended per Qt nomogram that poison control follows. Will continue to follow.

## 2019-08-10 NOTE — DISCHARGE SUMMARY
[facility]  Discharge Summary  Name: Ella Malave    MRN: [id number]  YOB: 1970    Age: 48 year old  Date of Discharge:  8/10/2019  Date of Admission: 8/8/2019  Primary Care Provider: Clinic, [facility]  Discharge Physician:  Eladio Karimi MD  Discharging Service:  Hospitalist      Discharge Diagnosis:  1. Delirium, metabolic encephalopathy, severe EtOH intoxication  2. No ongoing signs and symptoms of EtOH withdrawals  3. Known history of chemical dependency with previous overdoses  4. Chart review showed known chronic pain with drug-seeking behaviors and doctor shopping.  5. History of depression     Other Diagnosis:  Past Medical History:   Diagnosis Date     Back pain      Sciatic pain           Discharge Disposition:  Discharged to home     Allergies:  Allergies   Allergen Reactions     Codeine Sulfate      Darvocet [Propoxyphene N-Apap]         Discharge Medications:   Current Discharge Medication List      CONTINUE these medications which have NOT CHANGED    Details   carisoprodol (SOMA) 350 MG tablet Take 350 mg by mouth 3 times daily as needed for muscle spasms      citalopram (CELEXA) 20 MG tablet Take 20 mg by mouth daily      gabapentin (NEURONTIN) 300 MG capsule Take 900 mg by mouth 3 times daily       levothyroxine (SYNTHROID/LEVOTHROID) 88 MCG tablet Take 88 mcg by mouth daily      multivitamin, therapeutic with minerals (THERA-VIT-M) TABS Take 1 tablet by mouth daily.      oxyCODONE-acetaminophen (PERCOCET) 5-325 MG per tablet Take 1-2 tablets by mouth every 6 hours as needed for moderate to severe pain  Qty: 10 tablet, Refills: 0      ibuprofen (ADVIL/MOTRIN) 600 MG tablet Take 1 tablet (600 mg) by mouth every 6 hours as needed for moderate pain  Qty: 20 tablet, Refills: 1              Condition on Discharge:  Discharge condition: Stable   Discharge vitals: Blood pressure 125/79, pulse 79, temperature 99  F (37.2  C), temperature source Oral, resp.  "rate 16, height 1.676 m (5' 6\"), weight 75.6 kg (166 lb 10.7 oz), last menstrual period 06/20/2009, SpO2 96 %.   Code status on discharge: Full Code     History of Present Illness:  See detailed admission note for full details.        Significant Physical Exam Findings Day of Discharge:  HEENT; Atraumatic, normocephalic, pinkish conjuctiva, pupils bilateral reactive   Skin: warm and moist, no rashes  Lymphatics: no cervical or axillary lymphandenopathy  Lungs: equal chest expansion, clear to auscultation, no wheezes, no stridor, no crackles,   Heart: normal rate, normal rhythm, no rubs or gallops.   Abdomen: normal bowel sounds, no tenderness, no peritoneal signs, no guarding  Extremities: no deformities, no edema   Neuro; follow commands, alert and oriented x3, spontaneous speech, coherent, moves all extremities spontaneously  Psych; no hallucination, euthymic mood, not agitated        Procedures other than Imaging:  none     Imaging:  Results for orders placed or performed during the hospital encounter of 08/08/19   Head CT w/o contrast    Narrative    EXAM: CT HEAD W/O CONTRAST  LOCATION: [facility]  DATE/TIME: 8/9/2019 2:42 AM    INDICATION: Head injury  COMPARISON: 1/17/2018  TECHNIQUE: Routine without IV contrast. Multiplanar reformats. Dose reduction techniques were used.    FINDINGS:  INTRACRANIAL CONTENTS: No intracranial hemorrhage, extraaxial collection, or mass effect.  No CT evidence of acute infarct. Mild cortical atrophy frontal lobes bilaterally. The ventricles and sulci are normal for age.     VISUALIZED ORBITS/SINUSES/MASTOIDS: No significant orbital abnormality. No significant paranasal sinus mucosal disease. No significant middle ear or mastoid effusion.    BONES/SOFT TISSUES: Mild soft tissue swelling/hematoma overlying right frontal bone.      Impression    CONCLUSION:  1.  No hemorrhage, mass, or mass effect.        Consultations:  Consultation during this admission received from " psychiatry.     Recent Lab Results:  Recent Labs   Lab 08/10/19  0534 08/09/19  1136 08/08/19  2224   WBC 8.2  --  9.1   HGB 13.7  --  15.1   HCT 41.3  --  44.8   MCV 94  --  92    310 320     No results for input(s): CULT in the last 168 hours.  Recent Labs   Lab 08/10/19  0534 08/09/19  1610 08/09/19  1136 08/08/19  2224     --   --  136   POTASSIUM 4.0 4.8  --  3.0*   CHLORIDE 110*  --   --  102   CO2 23  --   --  26   ANIONGAP 5  --   --  8   *  --   --  92   BUN 9  --   --  7   CR 0.77  --  0.69 0.74   GFRESTIMATED >90  --  >90 >90   GFRESTBLACK >90  --  >90 >90   JOHN 8.5  --   --  9.3   PROTTOTAL 7.0  --   --  8.5   ALBUMIN 3.9  --   --  4.6   BILITOTAL 0.6  --   --  0.3   ALKPHOS 56  --   --  74   AST 12  --   --  12   ALT 16  --   --  22     Recent Labs   Lab 08/10/19  0751 08/10/19  0534 08/10/19  0350 08/09/19  2350 08/09/19 2002 08/09/19  1556  08/08/19  2224   GLC  --  104*  --   --   --   --   --  92   *  --  109* 121* 111* 104*   < >  --     < > = values in this interval not displayed.     No results for input(s): LACT in the last 168 hours.  No results for input(s): TROPONIN, TROPI, TROPR in the last 168 hours.    Invalid input(s): TROP, TROPONINIES  Recent Labs   Lab 08/09/19  0511   COLOR Straw   APPEARANCE Clear   URINEGLC Negative   URINEBILI Negative   URINEKETONE Negative   SG 1.004   UBLD Negative   URINEPH 6.0   PROTEIN Negative   NITRITE Negative   LEUKEST Negative   RBCU 0   WBCU 0          Pending Results:    Unresulted Labs Ordered in the Past 30 Days of this Admission     No orders found from 7/9/2019 to 8/9/2019.           Discharge Instructions and Follow-Up:   Discharge diet: Orders Placed This Encounter      Advance Diet as Tolerated: Regular Diet Adult      Diet     Discharge activity: Activity as tolerated   Discharge follow-up: 1-2 weeks with PCP, psychiatry as scheduled   Outpatient therapy: None    Other instructions: None      Hospital  Course:  Summary of Stay: Ella Malave is a 48 year old female with known prior history of chemical dependency, misuse of prescription drugs, reportedly had some drug-seeking behavior, chronic pain, admitted on 8/8/2019 with mental status changes found with EtOH intoxication and some concerns if she had some intention of self-harm with her prescription medications.  Initially managed in the ICU setting given delirium, agitation and was requiring IV Precedex.  Bedside  was also present during her stay.  Subsequently tapered and discontinued IV Precedex and no longer scoring with CIWA protocol and not requiring any benzodiazepines earlier.  Transferred out of the ICU setting to medical floors.  Seen by psychiatry service and I was informed by nursing staff that patient is optimized from their perspective and no further plans for inpatient psychiatric treatment.  Will proceed with home discharge today.  Patient stated that she is not interested with chemical dependency, EtOH cessation program.  No new prescriptions to be provided from my perspective.     Total time spent in face to face contact with the patient and coordinating discharge was:  > 30 Minutes.

## 2019-08-13 LAB — INTERPRETATION ECG - MUSE: NORMAL
